# Patient Record
Sex: FEMALE | Race: WHITE | Employment: OTHER | URBAN - METROPOLITAN AREA
[De-identification: names, ages, dates, MRNs, and addresses within clinical notes are randomized per-mention and may not be internally consistent; named-entity substitution may affect disease eponyms.]

---

## 2017-08-25 ENCOUNTER — TRANSFERRED RECORDS (OUTPATIENT)
Dept: HEALTH INFORMATION MANAGEMENT | Facility: CLINIC | Age: 82
End: 2017-08-25

## 2017-09-11 ENCOUNTER — TRANSFERRED RECORDS (OUTPATIENT)
Dept: HEALTH INFORMATION MANAGEMENT | Facility: CLINIC | Age: 82
End: 2017-09-11

## 2017-12-15 ENCOUNTER — TRANSFERRED RECORDS (OUTPATIENT)
Dept: HEALTH INFORMATION MANAGEMENT | Facility: CLINIC | Age: 82
End: 2017-12-15

## 2017-12-15 LAB
CREAT SERPL-MCNC: 0.74 MG/DL (ref 0.55–1.02)
GFR SERPL CREATININE-BSD FRML MDRD: >60 ML/MIN/1.73M2
GLUCOSE SERPL-MCNC: 114 MG/DL (ref 74–106)
POTASSIUM SERPL-SCNC: 3.8 MMOL/L (ref 3.5–5.1)

## 2018-01-31 ENCOUNTER — TRANSFERRED RECORDS (OUTPATIENT)
Dept: HEALTH INFORMATION MANAGEMENT | Facility: CLINIC | Age: 83
End: 2018-01-31

## 2018-02-01 ENCOUNTER — TRANSFERRED RECORDS (OUTPATIENT)
Dept: HEALTH INFORMATION MANAGEMENT | Facility: CLINIC | Age: 83
End: 2018-02-01

## 2018-02-13 ENCOUNTER — TELEPHONE (OUTPATIENT)
Dept: GASTROENTEROLOGY | Facility: CLINIC | Age: 83
End: 2018-02-13

## 2018-02-14 NOTE — TELEPHONE ENCOUNTER
INCOMING RECORDS DOCUMENTATION    Name of Caller: Kala  Phone Number: Home number on file [phone] (home)  Relation: Self    Location records coming from: [facility]  Date records sent: 02/13/18  Fax number records sent to: [phone]  Department receiving records: Adult Clinics: Gastroenterology (GI) p [id number]    Does patient have appointment: no   If has appointment, with which provider: Requesting Dr. Cho    Clinic to follow up with caller: yes  If called back can a detailed message be left: yes       Action taken: Message Routed to: Adult Clinics: Gastroenterology (GI) p [id number] - Carol Westholter

## 2018-02-15 NOTE — TELEPHONE ENCOUNTER
Records reviewed and noted no C Diff test results. Call placed to [facility] to have them faxed to me. Fax received, but will invalid information.  Spoke to patient who states that she had a positive C Diff during hospital stay in August ([facility]) and again on December 18th (specimen taken to [facility] in [place]).  Call placed again to [facility] department.  Message left to call me back as soon as possible.      Carol Westholter

## 2018-02-21 ENCOUNTER — OFFICE VISIT (OUTPATIENT)
Dept: GASTROENTEROLOGY | Facility: CLINIC | Age: 83
End: 2018-02-21
Payer: MEDICARE

## 2018-02-21 VITALS — WEIGHT: 128 LBS

## 2018-02-21 DIAGNOSIS — A04.71 RECURRENT CLOSTRIDIUM DIFFICILE DIARRHEA: Primary | ICD-10-CM

## 2018-02-21 PROCEDURE — 99205 OFFICE O/P NEW HI 60 MIN: CPT | Performed by: INTERNAL MEDICINE

## 2018-02-21 RX ORDER — CHLORAL HYDRATE 500 MG
CAPSULE ORAL
COMMUNITY

## 2018-02-21 RX ORDER — ASPIRIN 81 MG/1
TABLET ORAL
COMMUNITY
Start: 2017-01-18

## 2018-02-21 RX ORDER — ERGOCALCIFEROL 1.25 MG/1
CAPSULE, LIQUID FILLED ORAL
COMMUNITY

## 2018-02-21 RX ORDER — ACETAMINOPHEN 325 MG/1
TABLET ORAL
COMMUNITY
Start: 2017-01-18

## 2018-02-21 RX ORDER — ESCITALOPRAM OXALATE 5 MG/1
TABLET ORAL
COMMUNITY
Start: 2017-01-18

## 2018-02-21 RX ORDER — DIPHENOXYLATE HYDROCHLORIDE AND ATROPINE SULFATE 2.5; .025 MG/1; MG/1
TABLET ORAL
COMMUNITY

## 2018-02-21 RX ORDER — MUPIROCIN 20 MG/G
OINTMENT TOPICAL
COMMUNITY
Start: 2017-01-18

## 2018-02-21 RX ORDER — LEVALBUTEROL INHALATION SOLUTION 1.25 MG/3ML
SOLUTION RESPIRATORY (INHALATION)
COMMUNITY
Start: 2017-01-31

## 2018-02-21 RX ORDER — ATORVASTATIN CALCIUM 20 MG/1
TABLET, FILM COATED ORAL
Status: ON HOLD | COMMUNITY
Start: 2017-04-11 | End: 2018-09-10

## 2018-02-21 RX ORDER — MULTIVIT WITH MINERALS/LUTEIN
1000 TABLET ORAL
COMMUNITY

## 2018-02-21 RX ORDER — LOPERAMIDE HCL 2 MG
CAPSULE ORAL
COMMUNITY
Start: 2017-01-18

## 2018-02-21 RX ORDER — RAMIPRIL 2.5 MG/1
CAPSULE ORAL
COMMUNITY
End: 2020-02-24

## 2018-02-21 RX ORDER — ALPRAZOLAM 0.25 MG
TABLET ORAL
COMMUNITY
Start: 2017-03-09

## 2018-02-21 RX ORDER — ETHAMBUTOL HYDROCHLORIDE 400 MG/1
TABLET, FILM COATED ORAL
COMMUNITY
Start: 2017-02-16

## 2018-02-21 RX ORDER — ANASTROZOLE 1 MG/1
TABLET ORAL
COMMUNITY
Start: 2017-03-10

## 2018-02-21 RX ORDER — IPRATROPIUM BROMIDE AND ALBUTEROL SULFATE 2.5; .5 MG/3ML; MG/3ML
SOLUTION RESPIRATORY (INHALATION)
COMMUNITY
Start: 2017-01-18

## 2018-02-21 ASSESSMENT — PAIN SCALES - GENERAL: PAINLEVEL: NO PAIN (0)

## 2018-02-21 NOTE — NURSING NOTE
Kala Olmedo's goals for this visit include:   Chief Complaint   Patient presents with     Consult     C diff        She requests these members of her care team be copied on today's visit information: yes    PCP: No Ref-Primary, Physician    Referring Provider:  Eleonora Grier  [facility]  [address] [place] [address]    Chief Complaint   Patient presents with     Consult     C diff        Initial Wt 58.1 kg (128 lb) There is no height or weight on file to calculate BMI.  Medication Reconciliation: complete    Do you need any medication refills at today's visit? MAINE Montenegro, CMA

## 2018-02-21 NOTE — PROGRESS NOTES
Visit Date:   02/21/2018      The patient is an 87-year-old woman referred by Dr. Eleonora Grier from [facility] for evaluation and treatment of recurrent Clostridium difficile infection.  The patient is currently on vancomycin, doing reasonably well with about 3 bowel movements per day which she describes as soft and pasty.  She wears Depends.  She continues to be anxious about fecal urgency.  The infection was first diagnosed in 08/2017.  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  She also had exposure to Flagyl in May for bacterial vaginosis.  The pneumonia treatment included azithromycin, ethambutol and rifampin.  She did develop a yeast infection also as a complication.  When she did develop diarrhea, the bowel movement frequency increased about 4-5 per day, occasionally during the night.  Fecal urgency and near incontinence were a big concern, and she essentially became housebound.  This was treated with vancomycin with some improvement in symptoms.  She had another documented relapse in December.  Although it does not seem like the symptoms necessarily went away.  That was also treated with vancomycin once again with some improvement, and then she promptly relapsed again, and she continues to be on vancomycin now.  The last documented C. diff infection was 02/01 of this year.      PAST MEDICAL HISTORY:  Includes:   1.  Three hip surgeries in 2005/2006.   2.  Osteoarthritis.     3.  She has history of breast cancer, status post 1 breast removal in 2011.   4.  Hypertension.   5.  History of fall with fractures.   6.  A long history of tobacco use.  She quit a year ago.   7.  History of congestive heart failure.        MEDICATIONS:  She takes Imodium and as needed, but only occasionally.        SOCIAL HISTORY:  She is a retired schoolteacher.  She has never had a colonoscopy.  She uses a walker for mobility.       PHYSICAL EXAMINATION:   GENERAL:  The patient is slightly hard of hearing but otherwise alert, oriented, asking excellent questions, in no acute distress.      ASSESSMENT AND PLAN:  The patient is on her third course of vancomycin.  She has significant exposure to antibiotics over 2017.  She is elderly with some tendency toward fecal incontinence with loose stool.  I think she is at a very high risk for recurrence of Clostridium difficile infection.  I discussed 3 different options:   1.  Indefinite low dose suppressive vancomycin.   2.  Another would be FMT to break the cycle of recurrence.   3.  The third would be bezlotoxumab, a monoclonal antibody against C. difficile toxin which might allow a greater time period for microbiota recovery.        I think the third option is the least likely to work, primarily because this is her third episode, and exposure to antibiotics has been quite significant, and it is really unlikely that she is going to pull out of the cycle of recurrence given only modest improvement with bezlotoxumab even in primary infection.  I think the FMT is a reasonable option for her.  She already had an antibiotic complication other than C. difficile which is yeast infection, and she is not completely symptom-free on vancomycin.  The treatment is scheduled for 03/21.  We will administer oral FMT capsules.  I explained that the capsule treatment skips the diagnostic portion that often accompanies this procedure.  We could be missing some other conditions such as microscopic colitis.  However, given her age, I do not think a colonoscopy is warranted at this time.  If the capsule treatment fails, colonoscopic FMT would be the next step.      Total time spent in face-to-face consultation was 1 hour, over 50% was spent counseling and coordinating care.         JAGRUTI SABILLON MD             D: 02/21/2018   T: 02/21/2018   MT: SORAYA      Name:     NENO CORCORAN   MRN:      [id number]         Account:      [id number]   :      1930           Visit Date:   2018      Document: [id number]

## 2018-02-21 NOTE — MR AVS SNAPSHOT
After Visit Summary   2/21/2018    Kala Olmedo    MRN: [id number]           Patient Information     Date Of Birth          12/12/1930        Visit Information        Provider Department      2/21/2018 8:30 AM Stefano Cho MD [facility]        Care Instructions    1.           Follow-ups after your visit        Your next 10 appointments already scheduled     Mar 21, 2018 10:00 AM CDT   Return Visit with Stefano Cho MD   [facility]    [address] [place] [id number] [phone]              Who to contact     If you have questions or need follow up information about today's clinic visit or your schedule please contact [facility] directly at [phone].  Normal or non-critical lab and imaging results will be communicated to you by MyChart, letter or phone within 4 business days after the clinic has received the results. If you do not hear from us within 7 days, please contact the clinic through MyChart or phone. If you have a critical or abnormal lab result, we will notify you by phone as soon as possible.  Submit refill requests through C[employer] or call your pharmacy and they will forward the refill request to us. Please allow 3 business days for your refill to be completed.          Additional Information About Your Visit        Salesfusionhart Information     Cinetraffic is an electronic gateway that provides easy, online access to your medical records. With Cinetraffic, you can request a clinic appointment, read your test results, renew a prescription or communicate with your care team.     To sign up for [employer]ffic visit the website at [email or web address]   You will be asked to enter the access code listed below, as well as some personal information. Please follow the directions to create your username and password.     Your access code is: [email or web address]  Expires: 5/22/2018 10:01 AM     Your  access code will  in 90 days. If you need help or a new code, please contact your [facility] or call [phone] for assistance.        Care EveryWhere ID     This is your Care EveryWhere ID. This could be used by other organizations to access your [facility] medical records  [id number]         Blood Pressure from Last 3 Encounters:   No data found for BP    Weight from Last 3 Encounters:   18 58.1 kg (128 lb)              Today, you had the following     No orders found for display       Primary Care Provider Fax #    Physician No Ref-Primary [phone]       No address on file        Equal Access to Services     [facility]: Hadii aad ku hadasho Soomaali, waaxda luqadaha, qaybta kaalmada ademoeyada, gamaliel avalos . So [facility] [phone].    ATENCIÓN: Si habla español, tiene a esparza disposición servicios gratuitos de asistencia lingüística. Llame al [phone].    We comply with applicable federal civil rights laws and [place] laws. We do not discriminate on the basis of race, color, national origin, age, disability, sex, sexual orientation, or gender identity.            Thank you!     Thank you for choosing [facility]  for your care. Our goal is always to provide you with excellent care. Hearing back from our patients is one way we can continue to improve our services. Please take a few minutes to complete the written survey that you may receive in the mail after your visit with us. Thank you!             Your Updated Medication List - Protect others around you: Learn how to safely use, store and throw away your medicines at www.disposemymeds.org.          This list is accurate as of 18 10:01 AM.  Always use your most recent med list.                   Brand Name Dispense Instructions for use Diagnosis    ALPRAZolam 0.25 MG tablet    XANAX     TK 1 T PO  TID PRA        anastrozole 1 MG tablet    ARIMIDEX     TK 1 T PO  QD        ascorbic acid 1000 MG Tabs    vitamin C     Take 1,000 mg by mouth        aspirin EC 81 MG EC tablet      TK 1 T PO D        atorvastatin 20 MG tablet    LIPITOR          escitalopram 5 MG tablet    LEXAPRO     TK 1 T PO D        ethambutol 400 MG tablet    MYAMBUTOL          fish oil-omega-3 fatty acids 1000 MG capsule           INCRUSE ELLIPTA 62.5 MCG/INH oral inhaler   Generic drug:  umeclidinium      USE 1 PUFF D        ipratropium - albuterol 0.5 mg/2.5 mg/3 mL 0.5-2.5 (3) MG/3ML neb solution    DUONEB     NEBULIZE 1 VIAL Q 4 H PRF COPD        levalbuterol 1.25 MG/3ML neb solution    XOPENEX     NEBULIZE 1 VIAL QID FOR COPD        loperamide 2 MG capsule    IMODIUM     TK 1 TO 2 CS PO Q 4 H PRF DIARRHEA        MULTI-VITAMINS Tabs           mupirocin 2 % ointment    BACTROBAN     AVA TOPICALLY AA BID PRF SUPERFICIAL SKIN INFECTIONS        Q- MG tablet   Generic drug:  acetaminophen      TK 1 TO 2 TS PO Q 4 H PRN P. USE 1 T FOR PAIN SCALE 1 TO 5 AND USE 2 TS FOR PAIN SCALE 6 TO 10.        ramipril 2.5 MG capsule    ALTACE          VANCOMYCIN HCL PO      Take 125 mg by mouth        vitamin D 17617 UNIT capsule    ERGOCALCIFEROL

## 2018-02-27 ENCOUNTER — TELEPHONE (OUTPATIENT)
Dept: GASTROENTEROLOGY | Facility: CLINIC | Age: 83
End: 2018-02-27

## 2018-02-27 NOTE — TELEPHONE ENCOUNTER
Health Call Center    Phone Message    May a detailed message be left on voicemail: yes    Reason for Call: Other: patient is so worried and feels she just can't make it until 3/21- she requests a call back with any information that can be given.  please advise. the U did not have anything sooner with Dr BRISENO.     Action Taken: Message routed to:  Adult Clinics: Gastroenterology (GI) p [id number]

## 2018-02-28 NOTE — TELEPHONE ENCOUNTER
Returned call to patient to update that was the soonest available. She understood.    Alysa Churchill RN, BSN  [facility]  GI/Gen Surg/Hepatology Care Coordinator

## 2018-03-21 ENCOUNTER — OFFICE VISIT (OUTPATIENT)
Dept: GASTROENTEROLOGY | Facility: CLINIC | Age: 83
End: 2018-03-21
Payer: MEDICARE

## 2018-03-21 VITALS — SYSTOLIC BLOOD PRESSURE: 146 MMHG | HEART RATE: 99 BPM | DIASTOLIC BLOOD PRESSURE: 87 MMHG | WEIGHT: 126.8 LBS

## 2018-03-21 DIAGNOSIS — A04.71 RECURRENT CLOSTRIDIUM DIFFICILE DIARRHEA: Primary | ICD-10-CM

## 2018-03-21 PROCEDURE — 99214 OFFICE O/P EST MOD 30 MIN: CPT | Performed by: INTERNAL MEDICINE

## 2018-03-21 ASSESSMENT — PAIN SCALES - GENERAL: PAINLEVEL: NO PAIN (0)

## 2018-03-21 NOTE — MR AVS SNAPSHOT
After Visit Summary   3/21/2018    Kala Olmedo    MRN: [id number]           Patient Information     Date Of Birth          12/12/1930        Visit Information        Provider Department      3/21/2018 10:00 AM Stefano Cho MD [facility]        Today's Diagnoses     Recurrent Clostridium difficile diarrhea    -  1       Follow-ups after your visit        Your next 10 appointments already scheduled     May 16, 2018  9:30 AM CDT   Return Visit with Stefano Cho MD   [facility] ([facility])    [address] [place] [address] [phone]              Who to contact     If you have questions or need follow up information about today's clinic visit or your schedule please contact [facility] directly at [phone].  Normal or non-critical lab and imaging results will be communicated to you by MyChart, letter or phone within 4 business days after the clinic has received the results. If you do not hear from us within 7 days, please contact the clinic through MyChart or phone. If you have a critical or abnormal lab result, we will notify you by phone as soon as possible.  Submit refill requests through [employer] or call your pharmacy and they will forward the refill request to us. Please allow 3 business days for your refill to be completed.          Additional Information About Your Visit        [employer] ResearchharMEDOP Information     [employer] Point is an electronic gateway that provides easy, online access to your medical records. With [employer] Point, you can request a clinic appointment, read your test results, renew a prescription or communicate with your care team.     To sign up for [employer] Point visit the website at [email or web address]   You will be asked to enter the access code listed below, as well as some personal information. Please follow the directions to create your username and password.     Your access code is:  [email or web address]  Expires: 2018 11:01 AM     Your access code will  in 90 days. If you need help or a new code, please contact your [facility] or call [phone] for assistance.        Care EveryWhere ID     This is your Care EveryWhere ID. This could be used by other organizations to access your [facility] medical records  [id number]        Your Vitals Were     Pulse                   99            Blood Pressure from Last 3 Encounters:   18 146/87    Weight from Last 3 Encounters:   18 57.5 kg (126 lb 12.8 oz)   18 58.1 kg (128 lb)              Today, you had the following     No orders found for display       Primary Care Provider Fax #    Physician No Ref-Primary [phone]       No address on file        Equal Access to Services     [facility] : Hadii jesika tristan Soreanna, waaxda luqadaha, qaybta kaalmada adeegyada, gamaliel avalos . So [facility] [phone].    ATENCIÓN: Si habla español, tiene a esparza disposición servicios gratuitos de asistencia lingüística. Llame al [phone].    We comply with applicable federal civil rights laws and [place] laws. We do not discriminate on the basis of race, color, national origin, age, disability, sex, sexual orientation, or gender identity.            Thank you!     Thank you for choosing [facility]  for your care. Our goal is always to provide you with excellent care. Hearing back from our patients is one way we can continue to improve our services. Please take a few minutes to complete the written survey that you may receive in the mail after your visit with us. Thank you!             Your Updated Medication List - Protect others around you: Learn how to safely use, store and throw away your medicines at www.disposemymeds.org.          This list is accurate as of 3/21/18 10:58 AM.  Always use your most recent med list.                   Brand Name Dispense  Instructions for use Diagnosis    ALPRAZolam 0.25 MG tablet    XANAX     TK 1 T PO  TID PRA        anastrozole 1 MG tablet    ARIMIDEX     TK 1 T PO QD        ascorbic acid 1000 MG Tabs    vitamin C     Take 1,000 mg by mouth        aspirin EC 81 MG EC tablet      TK 1 T PO D        atorvastatin 20 MG tablet    LIPITOR          escitalopram 5 MG tablet    LEXAPRO     TK 1 T PO D        ethambutol 400 MG tablet    MYAMBUTOL          fish oil-omega-3 fatty acids 1000 MG capsule           INCRUSE ELLIPTA 62.5 MCG/INH oral inhaler   Generic drug:  umeclidinium      USE 1 PUFF D        ipratropium - albuterol 0.5 mg/2.5 mg/3 mL 0.5-2.5 (3) MG/3ML neb solution    DUONEB     NEBULIZE 1 VIAL Q 4 H PRF COPD        levalbuterol 1.25 MG/3ML neb solution    XOPENEX     NEBULIZE 1 VIAL QID FOR COPD        loperamide 2 MG capsule    IMODIUM     TK 1 TO 2 CS PO Q 4 H PRF DIARRHEA        MULTI-VITAMINS Tabs           mupirocin 2 % ointment    BACTROBAN     AVA TOPICALLY AA BID PRF SUPERFICIAL SKIN INFECTIONS        Q- MG tablet   Generic drug:  acetaminophen      TK 1 TO 2 TS PO Q 4 H PRN P. USE 1 T FOR PAIN SCALE 1 TO 5 AND USE 2 TS FOR PAIN SCALE 6 TO 10.        ramipril 2.5 MG capsule    ALTACE          VANCOMYCIN HCL PO      Take 125 mg by mouth        vitamin D 08674 UNIT capsule    ERGOCALCIFEROL

## 2018-03-21 NOTE — NURSING NOTE
Kala Olmedo's goals for this visit include:   Chief Complaint   Patient presents with     Procedure     FMT- Capsule        She requests these members of her care team be copied on today's visit information: yes    PCP: No Ref-Primary, Physician    Referring Provider:  No referring provider defined for this encounter.    Chief Complaint   Patient presents with     Procedure     FMT- Capsule        Initial /87  Pulse 99  Wt 57.5 kg (126 lb 12.8 oz) There is no height or weight on file to calculate BMI.  Medication Reconciliation: complete    Do you need any medication refills at today's visit? Yes    Amorrrenetta Montenegro CMA

## 2018-03-21 NOTE — PROGRESS NOTES
Visit Date:   2018      The patient is an 87-year-old woman with a history of multiply recurrent C. difficile infection.  She is here today for her oral capsule FMT.  A number of questions were answered.  She signed consent form and took 5 capsules containing 5 x 10 to the 11th bacteria in a freeze dried formulation.  Followup is in 2 months.  She is off vancomycin at this time.  The patient reported having 7 bowel movements yesterday.  She generally gets about 5 when she is on vancomycin.  She has been off vancomycin now for 2 days.      Total time spent in face-to-face consultation was 25 minutes.  Over 50% was spent in counseling and coordinating care.         JAGRUTI SABILLON MD             D: 2018   T: 2018   MT: HERI      Name:     NENO CORCORAN   MRN:      [id number]        Account:      [id number]   :      1930           Visit Date:   2018      Document: [id number]

## 2018-03-27 ENCOUNTER — TELEPHONE (OUTPATIENT)
Dept: GASTROENTEROLOGY | Facility: CLINIC | Age: 83
End: 2018-03-27

## 2018-03-27 DIAGNOSIS — R19.7 DIARRHEA: Primary | ICD-10-CM

## 2018-03-27 NOTE — TELEPHONE ENCOUNTER
[facility] Call [facility]    Phone Message    May a detailed message be left on voicemail: yes    Reason for Call: Other: Patient wants to know if she should go back on previous medication she was on, since she is not any better. call to discuss     Action Taken: Message routed to:  Adult Clinics: Gastroenterology (GI) p [id number]

## 2018-03-28 NOTE — TELEPHONE ENCOUNTER
Nurse returned call and recommended that she submit stool samples. She requested that the orders get sent to the [facility]. Nurse sent them there for her to complete.    Alysa Churchill RN, BSN  [facility]  GI/Gen Surg/Hepatology Care Coordinator

## 2018-03-29 ENCOUNTER — TRANSFERRED RECORDS (OUTPATIENT)
Dept: HEALTH INFORMATION MANAGEMENT | Facility: CLINIC | Age: 83
End: 2018-03-29

## 2018-03-30 NOTE — TELEPHONE ENCOUNTER
"Called and left message for [facility] Medical records with request to fax results to [phone].     Called and spoke to patient who reports that the results were sent to her PCP and the nurse called her this morning. Patient stated, \"Wednesday and Thursday was awful. I had 6 loose stools each day, but today is better. I was desperate and took a dose of immodium, so that's why I am better today.\" Informed patient that it is not recommended to take any additional doses of imodium. Patient verbalized understanding. Patient reports that she is eating and drinking okay. Patient denies dizziness, weakness, nausea, and fevers. Informed patient that Dr. Cho is out of the office today, but that writer will contact him for recommendations. Patient aware that she will be contacted with recommendations.    Writer paged Dr. Cho at [phone].    Dorcas Mcgrath RN, BSN          "

## 2018-03-30 NOTE — TELEPHONE ENCOUNTER
Patient reports some improvement relative to Wednesday and Thursday.  She will hold off vancomycin today.  I will call her again tomorrow to follow-up.

## 2018-03-30 NOTE — TELEPHONE ENCOUNTER
Patient called clinic wanting to speak with Dr. Cho nurses, stool sample came back positive for C-diff (still) patient states she cannot wait until her May appointment and would like to be seen eralier and would like a call back to advise her what she can do in the meantime. Thank you

## 2018-03-30 NOTE — TELEPHONE ENCOUNTER
Received return call from Dr. Cho who recommends treatment with vancomycin. Per Dr. Cho, he will contact the patient and will order the vancomycin. Will send message with request for the GI RNCC to follow up with patient next week.    Dorcas Mcgrath RN, BSN

## 2018-03-31 ENCOUNTER — TELEPHONE (OUTPATIENT)
Dept: GASTROENTEROLOGY | Facility: CLINIC | Age: 83
End: 2018-03-31

## 2018-03-31 NOTE — TELEPHONE ENCOUNTER
The patient is better today.  She only had one BM and it was firmer.  Last Imodium was on Thursday.

## 2018-04-03 ENCOUNTER — TELEPHONE (OUTPATIENT)
Dept: GASTROENTEROLOGY | Facility: CLINIC | Age: 83
End: 2018-04-03

## 2018-04-03 DIAGNOSIS — A04.71 RECURRENT CLOSTRIDIUM DIFFICILE DIARRHEA: Primary | ICD-10-CM

## 2018-04-10 ENCOUNTER — TELEPHONE (OUTPATIENT)
Dept: GASTROENTEROLOGY | Facility: CLINIC | Age: 83
End: 2018-04-10

## 2018-04-10 DIAGNOSIS — A04.72 COLITIS DUE TO CLOSTRIDIUM DIFFICILE: Primary | ICD-10-CM

## 2018-04-10 NOTE — TELEPHONE ENCOUNTER
[facility] Call Center    Phone Message    May a detailed message be left on voicemail: yes    Reason for Call: Patient called to [place] with a member of Dr. Cho care team. Requesting a call back today. Thank you     Action Taken: Message routed to:  Adult Clinics: Gastroenterology (GI) p [id number]

## 2018-04-10 NOTE — TELEPHONE ENCOUNTER
"Returned call and spoke to patient. Patient reports that it has been 5 weeks since her FMT and she is taking the vancomycin that Dr. Cho prescribed. Patient stated, \"I am feeling a little better, but I am calling about my vanco prescription.\" Patient reports that the pharmacy informed her that the insurance can't pay for the vanco because they don't make the liquid kind anymore and that the pharmacy can't use the current prescription. Patient is requesting for a new prescription for the commercially available vanco capsules to be sent to [facility] in [place]. Informed patient that a message will be sent with request for the GI team to follow up with this tomorrow. Patient reports that she does not need a call back.    Dorcas Mcgrath RN, BSN    "

## 2018-04-11 RX ORDER — VANCOMYCIN HYDROCHLORIDE 125 MG/1
125 CAPSULE ORAL 4 TIMES DAILY
Qty: 30 CAPSULE | Refills: 0 | Status: SHIPPED | OUTPATIENT
Start: 2018-04-11 | End: 2018-04-13

## 2018-04-11 NOTE — TELEPHONE ENCOUNTER
Reason for call:  Other   Patient called regarding (reason for call): call back  Additional comments: Still not feeling well, wants to be sooner than her May appointment.  She would appreciate a call from the GI care team, Tammie patient.     Phone number to reach patient:  Cell number on file:  [phone] (home)   No relevant phone numbers on file.       Best Time:  any    Can we leave a detailed message on this number?  YES

## 2018-04-11 NOTE — TELEPHONE ENCOUNTER
Nurse returned call to patient, she stated her insurance will not cover the solution anymore, requesting capsules. Nurse sent new script to her pharmacy. She has an appointment to follow up with Dr. Cho on  5/16/18, but does not feel she can make it that long. She continues to have diarrhea while on Vancomycin. She is having 8-10 loose stools per day. She had a previous capsule FMT on 3/21/18 that resulted in her developing Cdiff again, in the last note Dr. Cho suggested that she get another FMT. Nurse will send a message to Dr. Cho to determine if she needs an earlier follow up or if she can get scheduled another FMT.    Alysa Churchill RN, BSN  [facility]  GI/Gen Surg/Hepatology Care Coordinator

## 2018-04-11 NOTE — TELEPHONE ENCOUNTER
"Cleve Chowdhury received a voicemail for patient requesting an appointment with Dr. Cho. See message below    \"Good Yanet,  I just received a call from a patient looking to schedule an appointment.  Can you please contact the patient to help with scheduling? Thanks!\"    Kala Olmedo  Appointment with Dr. Cho for 4/18  [phone]      Cleve Chowdhury MBA   \"      Please reach out to the patient to confirm or clarify.    Thank you  "

## 2018-04-11 NOTE — TELEPHONE ENCOUNTER
Dr. Cho stated she should continue on vancomycin QID until she is seen in May, and that she can take Imodium for loose stools. He will call her tomorrow. Nurse updated patient and she verbalized understanding.    Alysa Churchill RN, BSN  [facility]  GI/Gen Surg/Hepatology Care Coordinator

## 2018-04-13 RX ORDER — VANCOMYCIN HYDROCHLORIDE 125 MG/1
125 CAPSULE ORAL DAILY
Qty: 30 CAPSULE | Refills: 0 | Status: SHIPPED | OUTPATIENT
Start: 2018-04-13

## 2018-04-30 ENCOUNTER — TELEPHONE (OUTPATIENT)
Dept: GASTROENTEROLOGY | Facility: CLINIC | Age: 83
End: 2018-04-30

## 2018-04-30 DIAGNOSIS — R19.7 DIARRHEA, UNSPECIFIED TYPE: Primary | ICD-10-CM

## 2018-04-30 NOTE — TELEPHONE ENCOUNTER
[facility] Call Center    Phone Message    May a detailed message be left on voicemail: yes    Reason for Call: Other: Patient is calling requeting to speak with the clinic regarding being in urgent care and following up as Urgent care doctor reccomends. Please advise.     Action Taken: Message routed to:  Adult Clinics: Gastroenterology (GI) p [id number]

## 2018-04-30 NOTE — TELEPHONE ENCOUNTER
Nurse returned call, she would like to set up the FMT capsule appointment. Nurse updated Dr. Cho and he will contact her, she verbalized understanding.    Alysa Churchill RN, BSN, PHN  [facility]  GI/Gen Surg/Hepatology Care Coordinator

## 2018-04-30 NOTE — TELEPHONE ENCOUNTER
The patient was in the hospital with a UTI x 5 days.  She is on vancomycin once daily.  She controls her diarrhea (incompletely) with vancomycin.      She claims that she is always C. Difficile positive while taking vancomycin, though.  This would be highly unusual, but we will test her at this time.

## 2018-05-02 ENCOUNTER — TELEPHONE (OUTPATIENT)
Dept: GASTROENTEROLOGY | Facility: CLINIC | Age: 83
End: 2018-05-02

## 2018-05-02 NOTE — TELEPHONE ENCOUNTER
[facility] Call Center    Phone Message    May a detailed message be left on voicemail: yes    Reason for Call: Patient called upset as Dr Cho asked that she drop off a stool sample at [facility] [place] in [place]. When patient arrived they were denied. Requesting order or authorization request to be sent so they will accept the sample. Thank you    Action Taken: Message routed to:  Adult Clinics: Gastroenterology (GI) p [id number]

## 2018-05-02 NOTE — TELEPHONE ENCOUNTER
Nurse called and left a message on patient's VM to return call.    Alysa Churchill RN, BSN, PHN  [facility]  GI/Gen Surg/Hepatology Care Coordinator

## 2018-05-04 NOTE — TELEPHONE ENCOUNTER
Patient returned call, stated she needed the orders sent over to [facility]. Nurse faxed over orders today.    Alysa Churchill RN, BSN, PHN  [facility]  GI/Gen Surg/Hepatology Care Coordinator

## 2018-05-07 ENCOUNTER — TRANSFERRED RECORDS (OUTPATIENT)
Dept: HEALTH INFORMATION MANAGEMENT | Facility: CLINIC | Age: 83
End: 2018-05-07

## 2018-05-10 ENCOUNTER — TELEPHONE (OUTPATIENT)
Dept: GASTROENTEROLOGY | Facility: CLINIC | Age: 83
End: 2018-05-10

## 2018-05-10 NOTE — TELEPHONE ENCOUNTER
[facility] Call Center    Phone Message    May a detailed message be left on voicemail: yes    Reason for Call: Other: Patient is calling to follow up on specimen results for stool samples that were sent in to [facility] on 05/07/18. Please call to advise.      Action Taken: Message routed to:  Adult Clinics: Gastroenterology (GI) p [id number]

## 2018-05-10 NOTE — TELEPHONE ENCOUNTER
The patient was contacted and informed that her 5/7/18 c.diff test results were NEGATIVE (available in Care Everywhere). She will follow up with Dr. Cho on 5/16/18.    Caroline Greer CMA

## 2018-05-16 ENCOUNTER — OFFICE VISIT (OUTPATIENT)
Dept: GASTROENTEROLOGY | Facility: CLINIC | Age: 83
End: 2018-05-16
Payer: MEDICARE

## 2018-05-16 VITALS — HEART RATE: 94 BPM | DIASTOLIC BLOOD PRESSURE: 83 MMHG | SYSTOLIC BLOOD PRESSURE: 134 MMHG | WEIGHT: 124.8 LBS

## 2018-05-16 DIAGNOSIS — A04.71 RECURRENT CLOSTRIDIUM DIFFICILE DIARRHEA: Primary | ICD-10-CM

## 2018-05-16 PROCEDURE — 99213 OFFICE O/P EST LOW 20 MIN: CPT | Performed by: INTERNAL MEDICINE

## 2018-05-16 ASSESSMENT — PAIN SCALES - GENERAL: PAINLEVEL: NO PAIN (0)

## 2018-05-16 NOTE — NURSING NOTE
Kala Olmedo's goals for this visit include:   Chief Complaint   Patient presents with     RECHECK     She requests these members of her care team be copied on today's visit information: None    Initial /83 (BP Location: Left arm, Patient Position: Chair, Cuff Size: Adult Regular)  Pulse 94  Wt 56.6 kg (124 lb 12.8 oz) There is no height or weight on file to calculate BMI.  BP completed using cuff size: regular

## 2018-05-16 NOTE — MR AVS SNAPSHOT
After Visit Summary   2018    Kala Olmedo    MRN: [id number]           Patient Information     Date Of Birth          1930        Visit Information        Provider Department      2018 9:30 AM Stefano Cho MD [facility]        Today's Diagnoses     Recurrent Clostridium difficile diarrhea    -  1       Follow-ups after your visit        Who to contact     If you have questions or need follow up information about today's clinic visit or your schedule please contact [facility] directly at [phone].  Normal or non-critical lab and imaging results will be communicated to you by [employer], letter or phone within 4 business days after the clinic has received the results. If you do not hear from us within 7 days, please contact the clinic through [employer] or phone. If you have a critical or abnormal lab result, we will notify you by phone as soon as possible.  Submit refill requests through [employer] or call your pharmacy and they will forward the refill request to us. Please allow 3 business days for your refill to be completed.          Additional Information About Your Visit        MyChart Information     [employer] is an electronic gateway that provides easy, online access to your medical records. With [employer], you can request a clinic appointment, read your test results, renew a prescription or communicate with your care team.     To sign up for [employer] visit the website at [email or web address]   You will be asked to enter the access code listed below, as well as some personal information. Please follow the directions to create your username and password.     Your access code is: [email or web address]  Expires: 2018 12:53 PM     Your access code will  in 90 days. If you need help or a new code, please contact your [facility] or call [phone] for assistance.        Care EveryWhere ID     This is your Care  EveryWhere ID. This could be used by other organizations to access your [place] medical records  [id number]        Your Vitals Were     Pulse                   94            Blood Pressure from Last 3 Encounters:   05/16/18 134/83   03/21/18 146/87    Weight from Last 3 Encounters:   05/16/18 56.6 kg (124 lb 12.8 oz)   03/21/18 57.5 kg (126 lb 12.8 oz)   02/21/18 58.1 kg (128 lb)              Today, you had the following     No orders found for display       Primary Care Provider Fax #    Physician No Ref-Primary [phone]       No address on file        Equal Access to Services     [facility]: Hadii jesika tristan Soreanna, wahipolitoda luvamsi, qaybta kaalmada emelia, gamaliel avalos . So [facility] [phone].    ATENCIÓN: Si habla español, tiene a esparza disposición servicios gratuitos de asistencia lingüística. Llame al [phone].    We comply with applicable federal civil rights laws and [place] laws. We do not discriminate on the basis of race, color, national origin, age, disability, sex, sexual orientation, or gender identity.            Thank you!     Thank you for choosing [facility]  for your care. Our goal is always to provide you with excellent care. Hearing back from our patients is one way we can continue to improve our services. Please take a few minutes to complete the written survey that you may receive in the mail after your visit with us. Thank you!             Your Updated Medication List - Protect others around you: Learn how to safely use, store and throw away your medicines at www.disposemymeds.org.          This list is accurate as of 5/16/18 11:59 PM.  Always use your most recent med list.                   Brand Name Dispense Instructions for use Diagnosis    ALPRAZolam 0.25 MG tablet    XANAX     TK 1 T PO  TID PRA        anastrozole 1 MG tablet    ARIMIDEX     TK 1 T PO QD        ascorbic acid 1000 MG Tabs    vitamin C     Take 1,000 mg by  mouth        aspirin 81 MG EC tablet      TK 1 T PO D        atorvastatin 20 MG tablet    LIPITOR          escitalopram 5 MG tablet    LEXAPRO     TK 1 T PO D        ethambutol 400 MG tablet    MYAMBUTOL          fish oil-omega-3 fatty acids 1000 MG capsule           INCRUSE ELLIPTA 62.5 MCG/INH oral inhaler   Generic drug:  umeclidinium      USE 1 PUFF D        ipratropium - albuterol 0.5 mg/2.5 mg/3 mL 0.5-2.5 (3) MG/3ML neb solution    DUONEB     NEBULIZE 1 VIAL Q 4 H PRF COPD        levalbuterol 1.25 MG/3ML neb solution    XOPENEX     NEBULIZE 1 VIAL QID FOR COPD        loperamide 2 MG capsule    IMODIUM     TK 1 TO 2 CS PO Q 4 H PRF DIARRHEA        MULTI-VITAMINS Tabs           mupirocin 2 % ointment    BACTROBAN     AVA TOPICALLY AA BID PRF SUPERFICIAL SKIN INFECTIONS        Q- MG tablet   Generic drug:  acetaminophen      TK 1 TO 2 TS PO Q 4 H PRN P. USE 1 T FOR PAIN SCALE 1 TO 5 AND USE 2 TS FOR PAIN SCALE 6 TO 10.        ramipril 2.5 MG capsule    ALTACE          * VANCOMYCIN HCL PO      Take 125 mg by mouth        * vancomycin 50 mg/mL Liqd solution    VANOCIN    100 mL    Take 2.5 mLs (125 mg) by mouth 4 times daily    Recurrent Clostridium difficile diarrhea       * vancomycin 125 MG capsule    VANCOCIN    30 capsule    Take 1 capsule (125 mg) by mouth daily    Colitis due to Clostridium difficile       vitamin D 43668 UNIT capsule    ERGOCALCIFEROL          * Notice:  This list has 3 medication(s) that are the same as other medications prescribed for you. Read the directions carefully, and ask your doctor or other care provider to review them with you.

## 2018-05-16 NOTE — PROGRESS NOTES
Visit Date:   2018      The patient is an 87-year-old woman with multiple recurrent C. difficile infections.  She was treated with capsule FMT in March.  However, relapsed within 2 weeks, was placed back on vancomycin.  In April she was actually hospitalized for urinary tract infection but continued with the vancomycin.  She is doing fine, having about 4 bowel movements a day.  The question is whether to repeat the FMT or to remain on vancomycin.  She is 87 years old but reasonably healthy otherwise, sharp mind.  She had only 1 bladder infection so far.  There is no history of recurrent infections.  After some discussion, we decided to re-treat with another capsule administration.  She is running low on the vancomycin supply at this time, so we should be able to do this within a couple days.      Total time spent in face-to-face consultation was 15 minutes; over 50% was spent counseling and coordinating care.         JAGRUTI SABILLON MD             D: 2018   T: 2018   MT: HERI      Name:     NENO CORCORAN   MRN:      [id number]        Account:      [id number]   :      1930           Visit Date:   2018      Document: [id number]

## 2018-05-29 ENCOUNTER — TELEPHONE (OUTPATIENT)
Dept: GASTROENTEROLOGY | Facility: CLINIC | Age: 83
End: 2018-05-29

## 2018-05-29 ENCOUNTER — TRANSFERRED RECORDS (OUTPATIENT)
Dept: HEALTH INFORMATION MANAGEMENT | Facility: CLINIC | Age: 83
End: 2018-05-29

## 2018-05-29 NOTE — TELEPHONE ENCOUNTER
Lab order successfully faxed to [facility] at fax# [phone].    Dorcas Mcgrath RN, BSN

## 2018-05-29 NOTE — TELEPHONE ENCOUNTER
Returned call to patient and discussed the following:    Triage Nurse Call    Symptoms- patient reports 7-8 loose, liquid stools this morning from 6am to 11am.     MD- Dr. Cho    Background- history of c.diff and FMT    Pain- no reports of pain    N/V or fever- denies nausea, vomiting, fevers, and dizziness. Patient reports that she has a decreased appetite but has been able to drink fluids without difficulty.    Medications- patient reports that she took an imodium today at 11am. Informed patient to not take any additional imodium until results of stool test are known. Patient verbalized understanding.    Nurse Action- Informed patient that it is recommended to complete a stool sample to check for c.diff. Patient reports that she is unable to go to a [facility] lab and would like the lab order sent to [facility] in [place]. Lab order for c.diff successfully faxed to [facility] at fax# [phone]. Requested for [facility] to fax results back to clinic.    Dorcas Mcgrath RN, BSN

## 2018-05-29 NOTE — TELEPHONE ENCOUNTER
Patient is trying to drop off a stool sample to be tested at [facility] [place]. They cannot process without an order. Please send an order via fax to [phone]

## 2018-05-29 NOTE — TELEPHONE ENCOUNTER
[facility] Call Center    Phone Message    May a detailed message be left on voicemail: no    Reason for Call: Symptoms or Concerns     If patient has red-flag symptoms, warm transfer to triage line    Current symptom or concern: diarrhea    Symptoms have been present for:  A couple day(s)    Has patient previously been seen for this? No    Are there any new or worsening symptoms? Yes      Action Taken: Message routed to:  Adult Clinics: Gastroenterology (GI) p [id number]

## 2018-05-30 ENCOUNTER — TELEPHONE (OUTPATIENT)
Dept: GASTROENTEROLOGY | Facility: CLINIC | Age: 83
End: 2018-05-30

## 2018-05-30 DIAGNOSIS — A04.71 RECURRENT CLOSTRIDIUM DIFFICILE DIARRHEA: Primary | ICD-10-CM

## 2018-05-30 RX ORDER — VANCOMYCIN HYDROCHLORIDE 125 MG/1
125 CAPSULE ORAL 4 TIMES DAILY
Qty: 40 CAPSULE | Refills: 0 | Status: SHIPPED | OUTPATIENT
Start: 2018-05-30 | End: 2018-06-09

## 2018-05-30 RX ORDER — ACETAMINOPHEN 325 MG/1
650 TABLET ORAL
Status: CANCELLED | OUTPATIENT
Start: 2018-06-06

## 2018-05-30 NOTE — TELEPHONE ENCOUNTER
The stool test was positive for C. Difficile.  She had some loose BMs yesterday and today, but isn't feeling too bad.  Capsule FMT was 5.19.  So, relapse is ~ day 10.    The plan is to re-start vancomycin now, get Zinplava infusion toward the end of next week, and do another capsule FMT once vancomycin is finished.

## 2018-05-30 NOTE — TELEPHONE ENCOUNTER
Please arrange her Zinplava infusion next week.  Thanks, Pasha     Nurse spoke to patient and gave her the number to call and schedule appointment.    Alysa Churchill RN, BSN, PHN  [facility]  GI/Gen Surg/Hepatology Care Coordinator

## 2018-05-30 NOTE — TELEPHONE ENCOUNTER
5/29/18 c.diff test available through Care Everywhere ([facility]) - positive result. Will route to [facility] to review and advise.    Caroline Greer, CMA

## 2018-05-30 NOTE — TELEPHONE ENCOUNTER
5.30.18  Patient requesting lab results today.  She dropped off stool sample yesterday and wants a call back today.   [phone]

## 2018-06-07 ENCOUNTER — INFUSION THERAPY VISIT (OUTPATIENT)
Dept: INFUSION THERAPY | Facility: CLINIC | Age: 83
End: 2018-06-07
Attending: INTERNAL MEDICINE
Payer: MEDICARE

## 2018-06-07 VITALS
RESPIRATION RATE: 18 BRPM | HEART RATE: 102 BPM | WEIGHT: 123.9 LBS | OXYGEN SATURATION: 97 % | TEMPERATURE: 98.7 F | DIASTOLIC BLOOD PRESSURE: 63 MMHG | SYSTOLIC BLOOD PRESSURE: 135 MMHG

## 2018-06-07 DIAGNOSIS — A04.71 RECURRENT CLOSTRIDIUM DIFFICILE DIARRHEA: Primary | ICD-10-CM

## 2018-06-07 PROCEDURE — 25000128 H RX IP 250 OP 636: Mod: ZF | Performed by: INTERNAL MEDICINE

## 2018-06-07 PROCEDURE — 96413 CHEMO IV INFUSION 1 HR: CPT

## 2018-06-07 RX ORDER — ACETAMINOPHEN 325 MG/1
650 TABLET ORAL
Status: CANCELLED | OUTPATIENT
Start: 2018-06-07

## 2018-06-07 RX ADMIN — SODIUM CHLORIDE 562 MG: 900 INJECTION, SOLUTION INTRAVENOUS at 15:35

## 2018-06-07 NOTE — MR AVS SNAPSHOT
After Visit Summary   6/7/2018    Kala Olmedo    MRN: [id number]           Patient Information     Date Of Birth          12/12/1930        Visit Information        Provider Department      6/7/2018 3:00 PM UC 45 ATC; [facility] SPEC INFUSION [facility] Specialty and Procedure        Today's Diagnoses     Recurrent Clostridium difficile diarrhea    -  1      Care Instructions    Dear Kala Olmedo    Thank you for choosing [facility] ([facility]) for your infusion.  The following information is a summary of our appointment as well as important reminders.        Bezlotoxumab injection  Brand Name: ZINPLAVA  What is this medicine?  BEZLOTOXUMAB (bez mary TOX ue mab) is used for certain kinds of bacterial infections in the bowel. It will not work for colds, flu, or other viral infections.  How should I use this medicine?  This medicine is for infusion into a vein. It is given by a health care professional in a hospital or clinic setting.  Talk to your pediatrician regarding the use of this medicine in children. Special care may be needed.  What side effects may I notice from receiving this medicine?  Side effects that you should report to your doctor or health care professional as soon as possible:    allergic reactions like skin rash, itching or hives, swelling of the face, lips, or tongue    breathing problems  Side effects that usually do not require medical attention (report to your doctor or health care professional if they continue or are bothersome):    dizziness    fever    headache    nausea    tiredness  What may interact with this medicine?  Interactions are not expected.  What if I miss a dose?  This does not apply; this medicine is not for regular use.  Where should I keep my medicine?  This drug is given in a hospital or clinic and will not be stored at home.  What should I tell my health care provider before I take  this medicine?  They need to know if you have any of these conditions:    heart disease    an unusual or allergic reaction to bezlotoxumab, other medicines, foods, dyes, or preservatives    pregnant or trying to get pregnant    breast-feeding  What should I watch for while using this medicine?  Tell your doctor or healthcare professional if your symptoms do not start to get better or if they get worse.  Do not treat diarrhea with over the counter products.  NOTE:This sheet is a summary. It may not cover all possible information. If you have questions about this medicine, talk to your doctor, pharmacist, or health care provider. Copyright  2018 Elsevier          Additional information: you received an infusion of Zinplava via IV today.       We look forward in seeing you on your next appointment here at [facility].  Please don t hesitate to call us at [phone] to reschedule any of your appointments or to speak with one of the [facility] registered nurses.  It was a pleasure taking care of you today.    Sincerely,    [facility]  Specialty Infusion & Procedure Center  [address] [place] [address]  [phone]  Phone:  [phone]            Follow-ups after your visit        Who to contact     If you have questions or need follow up information about today's clinic visit or your schedule please contact [facility] SPECIALTY AND PROCEDURE directly at [phone].  Normal or non-critical lab and imaging results will be communicated to you by MyChart, letter or phone within 4 business days after the clinic has received the results. If you do not hear from us within 7 days, please contact the clinic through MyChart or phone. If you have a critical or abnormal lab result, we will notify you by phone as soon as possible.  Submit refill requests through [employer] or call your pharmacy and they will forward the refill request to us. Please allow 3 business days for your refill to  "be completed.          Additional Information About Your Visit        [employer] Information     [employer] lets you send messages to your doctor, view your test results, renew your prescriptions, schedule appointments and more. To sign up, go to [email or web address] . Click on \"Log in\" on the left side of the screen, which will take you to the Welcome page. Then click on \"Sign up Now\" on the right side of the page.     You will be asked to enter the access code listed below, as well as some personal information. Please follow the directions to create your username and password.     Your access code is: [email or web address]  Expires: 2018 12:53 PM     Your access code will  in 90 days. If you need help or a new code, please call your [place] clinic or [phone].        Care EveryWhere ID     This is your Care EveryWhere ID. This could be used by other organizations to access your [place] medical records  [id number]        Your Vitals Were     Pulse Temperature Respirations Pulse Oximetry          92 98.7  F (37.1  C) (Oral) 18 97%         Blood Pressure from Last 3 Encounters:   18 127/83   18 134/83   18 146/87    Weight from Last 3 Encounters:   18 56.2 kg (123 lb 14.4 oz)   18 56.6 kg (124 lb 12.8 oz)   18 57.5 kg (126 lb 12.8 oz)              Today, you had the following     No orders found for display       Primary Care Provider Fax #    Physician No Ref-Primary [phone]       No address on file        Equal Access to Services     [facility] : Hadii jesika tristan Soreanna, waaxda luqadaha, qaybta kaalmagamaliel eason. So [facility] [phone].    ATENCIÓN: Si habla español, tiene a esparza disposición servicios gratuitos de asistencia lingüística. Llame al [phone].    We comply with applicable federal civil rights laws and [place] laws. We do not discriminate on the basis of race, color, national origin, age, disability, " sex, sexual orientation, or gender identity.            Thank you!     Thank you for choosing [facility] SPECIALTY AND PROCEDURE  for your care. Our goal is always to provide you with excellent care. Hearing back from our patients is one way we can continue to improve our services. Please take a few minutes to complete the written survey that you may receive in the mail after your visit with us. Thank you!             Your Updated Medication List - Protect others around you: Learn how to safely use, store and throw away your medicines at www.disposemymeds.org.          This list is accurate as of 6/7/18  3:20 PM.  Always use your most recent med list.                   Brand Name Dispense Instructions for use Diagnosis    ALPRAZolam 0.25 MG tablet    XANAX     TK 1 T PO  TID PRA        anastrozole 1 MG tablet    ARIMIDEX     TK 1 T PO QD        ascorbic acid 1000 MG Tabs    vitamin C     Take 1,000 mg by mouth        aspirin 81 MG EC tablet      TK 1 T PO D        atorvastatin 20 MG tablet    LIPITOR          escitalopram 5 MG tablet    LEXAPRO     TK 1 T PO D        ethambutol 400 MG tablet    MYAMBUTOL          fish oil-omega-3 fatty acids 1000 MG capsule           INCRUSE ELLIPTA 62.5 MCG/INH oral inhaler   Generic drug:  umeclidinium      USE 1 PUFF D        ipratropium - albuterol 0.5 mg/2.5 mg/3 mL 0.5-2.5 (3) MG/3ML neb solution    DUONEB     NEBULIZE 1 VIAL Q 4 H PRF COPD        levalbuterol 1.25 MG/3ML neb solution    XOPENEX     NEBULIZE 1 VIAL QID FOR COPD        loperamide 2 MG capsule    IMODIUM     TK 1 TO 2 CS PO Q 4 H PRF DIARRHEA        MULTI-VITAMINS Tabs           mupirocin 2 % ointment    BACTROBAN     AVA TOPICALLY AA BID PRF SUPERFICIAL SKIN INFECTIONS        Q- MG tablet   Generic drug:  acetaminophen      TK 1 TO 2 TS PO Q 4 H PRN P. USE 1 T FOR PAIN SCALE 1 TO 5 AND USE 2 TS FOR PAIN SCALE 6 TO 10.        ramipril 2.5 MG capsule    ALTACE          * VANCOMYCIN  HCL PO      Take 125 mg by mouth        * vancomycin 50 mg/mL Liqd solution    VANOCIN    100 mL    Take 2.5 mLs (125 mg) by mouth 4 times daily    Recurrent Clostridium difficile diarrhea       * vancomycin 125 MG capsule    VANCOCIN    30 capsule    Take 1 capsule (125 mg) by mouth daily    Colitis due to Clostridium difficile       * vancomycin 125 MG capsule    VANCOCIN    40 capsule    Take 1 capsule (125 mg) by mouth 4 times daily for 10 days    Recurrent Clostridium difficile diarrhea       vitamin D 29851 UNIT capsule    ERGOCALCIFEROL          * Notice:  This list has 4 medication(s) that are the same as other medications prescribed for you. Read the directions carefully, and ask your doctor or other care provider to review them with you.

## 2018-06-07 NOTE — PROGRESS NOTES
Nursing Note  Kala Olmedo [facility] for:   Chief Complaint   Patient presents with     Infusion     Zinplava     During today's [facility] appointment, orders from Dr. Cho were completed.  Frequency: once    Progress note:  Patient identification verified by name and date of birth.  Assessment completed.  Vitals recorded in Doc Flowsheets.  Patient was provided with education regarding infusion and possible side effects.  Patient verbalized understanding.      needed: No  Premedications: were not ordered.  Infusion Rates: infusion given over approximately 1 hour.  Approximate Infusion length:1 hours.   Labs: were not ordered for this appointment.  Vascular access: peripheral IV placed today.  Treatment Conditions: non-applicable.  Patient tolerated infusion: well.        Discharge Plan:   Follow up plan of care with: no scheduled future appointments.   Discharge instructions were reviewed with patient.  Patient/representative verbalized understanding of discharge instructions and all questions answered.  Patient discharged from [facility] in stable condition.    Candace Verma RN       Administrations This Visit     bezlotoxumab (ZINPLAVA) 562 mg in sodium chloride 0.9 % 150 mL intermittent infusion     Admin Date Action Dose Rate Route Administered By          06/07/2018 New Bag 562 mg 187 mL/hr Intravenous Candace Verma RN                           /83  Pulse 92  Temp 98.7  F (37.1  C) (Oral)  Resp 18  Wt 56.2 kg (123 lb 14.4 oz)  SpO2 97%

## 2018-06-07 NOTE — PATIENT INSTRUCTIONS
Dear Kala Olmedo    Thank you for choosing [facility] ([facility]) for your infusion.  The following information is a summary of our appointment as well as important reminders.        Bezlotoxumab injection  Brand Name: ZINPLAVA  What is this medicine?  BEZLOTOXUMAB (bez mary TOX ue mab) is used for certain kinds of bacterial infections in the bowel. It will not work for colds, flu, or other viral infections.  How should I use this medicine?  This medicine is for infusion into a vein. It is given by a health care professional in a hospital or clinic setting.  Talk to your pediatrician regarding the use of this medicine in children. Special care may be needed.  What side effects may I notice from receiving this medicine?  Side effects that you should report to your doctor or health care professional as soon as possible:    allergic reactions like skin rash, itching or hives, swelling of the face, lips, or tongue    breathing problems  Side effects that usually do not require medical attention (report to your doctor or health care professional if they continue or are bothersome):    dizziness    fever    headache    nausea    tiredness  What may interact with this medicine?  Interactions are not expected.  What if I miss a dose?  This does not apply; this medicine is not for regular use.  Where should I keep my medicine?  This drug is given in a hospital or clinic and will not be stored at home.  What should I tell my health care provider before I take this medicine?  They need to know if you have any of these conditions:    heart disease    an unusual or allergic reaction to bezlotoxumab, other medicines, foods, dyes, or preservatives    pregnant or trying to get pregnant    breast-feeding  What should I watch for while using this medicine?  Tell your doctor or healthcare professional if your symptoms do not start to get better or if they get worse.  Do not  treat diarrhea with over the counter products.  NOTE:This sheet is a summary. It may not cover all possible information. If you have questions about this medicine, talk to your doctor, pharmacist, or health care provider. Copyright  2018 [employer]          Additional information: you received an infusion of Zinplava via IV today.       We look forward in seeing you on your next appointment here at [facility].  Please don t hesitate to call us at [phone] to reschedule any of your appointments or to speak with one of the [facility] registered nurses.  It was a pleasure taking care of you today.    Sincerely,    [facility]  [address] [place] [address]  [address]  Phone:  [phone]

## 2018-06-13 ENCOUNTER — TELEPHONE (OUTPATIENT)
Dept: GASTROENTEROLOGY | Facility: CLINIC | Age: 83
End: 2018-06-13

## 2018-06-13 DIAGNOSIS — R19.7 DIARRHEA, UNSPECIFIED TYPE: Primary | ICD-10-CM

## 2018-06-13 NOTE — TELEPHONE ENCOUNTER
Ms. Kala Olmedo took a set of FMT capsules (her 3rd try) yesterday, 6.12.18. Her Zinplava infusion was 6.7.18; last dose of vancomycin 6.9.18.has had a rough first day and called about taking Imodium.    She has history of multiple FMT failures.  She reports that while on vancomycin she had ~ 4 loose BMs per day.  Yesterday she had 5 and they were looser.  She had an accident this morning.  There was no C. Difficile smell.      She was advised that at this time Imodium is okay if she needs to go out of the house. A standing order for C. Difficile testing is placed.  It should be sent to her [facility] in [place].

## 2018-06-14 ENCOUNTER — TELEPHONE (OUTPATIENT)
Dept: GASTROENTEROLOGY | Facility: CLINIC | Age: 83
End: 2018-06-14

## 2018-06-14 NOTE — TELEPHONE ENCOUNTER
The patient took one Imodium in the morning.  She had 2 small bowel movements.  She denies abdominal pain and feels better today.

## 2018-06-27 ENCOUNTER — TRANSFERRED RECORDS (OUTPATIENT)
Dept: HEALTH INFORMATION MANAGEMENT | Facility: CLINIC | Age: 83
End: 2018-06-27

## 2018-06-30 ENCOUNTER — TELEPHONE (OUTPATIENT)
Dept: GASTROENTEROLOGY | Facility: CLINIC | Age: 83
End: 2018-06-30

## 2018-06-30 NOTE — TELEPHONE ENCOUNTER
"I called Ms. Olmedo to follow-up on the positive C. Difficile test from [facility].  She had a \"good day\" yesterday, but had 5 mushy BMs today.  She took some Imodium and is doing well now.    Ms. Kala Olmedo took a set of FMT capsules (her 3rd try) yesterday, 6.12.18. Her Zinplava infusion was 6.7.18; last dose of vancomycin 6.9.18.has had a rough first day and called about taking Imodium.    Since she is still covered by Zinplava, she is okay to take Imodium.  Calprotectin is still pending.  Will continue to follow -- it is premature to start vancomycin.  "

## 2018-07-02 ENCOUNTER — TELEPHONE (OUTPATIENT)
Dept: GASTROENTEROLOGY | Facility: CLINIC | Age: 83
End: 2018-07-02

## 2018-07-09 ENCOUNTER — TELEPHONE (OUTPATIENT)
Dept: GASTROENTEROLOGY | Facility: CLINIC | Age: 83
End: 2018-07-09

## 2018-07-30 ENCOUNTER — TELEPHONE (OUTPATIENT)
Dept: GASTROENTEROLOGY | Facility: CLINIC | Age: 83
End: 2018-07-30

## 2018-07-30 ENCOUNTER — TRANSFERRED RECORDS (OUTPATIENT)
Dept: HEALTH INFORMATION MANAGEMENT | Facility: CLINIC | Age: 83
End: 2018-07-30

## 2018-07-30 DIAGNOSIS — R19.7 DIARRHEA, UNSPECIFIED TYPE: Primary | ICD-10-CM

## 2018-07-30 NOTE — TELEPHONE ENCOUNTER
The patient complains of foul-smelling, loose stools.  They don't wake her up during the night.  She is avoiding Imodium.    The plan is to re-test for C. Difficile and calprotectin.

## 2018-08-06 ENCOUNTER — TELEPHONE (OUTPATIENT)
Dept: GASTROENTEROLOGY | Facility: CLINIC | Age: 83
End: 2018-08-06

## 2018-08-06 DIAGNOSIS — A04.71 RECURRENT CLOSTRIDIUM DIFFICILE DIARRHEA: Primary | ICD-10-CM

## 2018-08-06 NOTE — TELEPHONE ENCOUNTER
The patient reports 5-6 loose BMs in the mornings and usually feels better in the afternoons.  There is really no improvement in symptoms.    Labs still demonstrate positive C. Difficile testing and increased calprotectin (although it is somewhat improved).    We will start vancomycin and she can decide whether to continue it indefinitely or proceed to colonoscopic FMT.

## 2018-08-08 ENCOUNTER — TELEPHONE (OUTPATIENT)
Dept: GASTROENTEROLOGY | Facility: CLINIC | Age: 83
End: 2018-08-08

## 2018-08-08 NOTE — TELEPHONE ENCOUNTER
Spoke with [facility] that Dr Cho will have patient take vancomycin until patient decides to have FMT colonoscopy. Pharmacy verbalized understanding     Ricki ROTHMAN

## 2018-08-08 NOTE — TELEPHONE ENCOUNTER
[facility] Call Center    Phone Message    May a detailed message be left on voicemail: yes    Reason for Call: Other: what is the amount of time the patient should be taknig the medication? please advise pharmacy     Action Taken: Message routed to:  Adult Clinics: Gastroenterology (GI) p [id number]

## 2018-08-14 ENCOUNTER — TELEPHONE (OUTPATIENT)
Dept: GASTROENTEROLOGY | Facility: CLINIC | Age: 83
End: 2018-08-14

## 2018-08-14 NOTE — TELEPHONE ENCOUNTER
[facility] Call Center    Phone Message    May a detailed message be left on voicemail: yes    Reason for Call: Patient is calling regarding questions she has about a colonoscopy.  Patient would like to be seen 08/27/18  Please advise [phone]    Action Taken: Message routed to:  Adult Clinics: Gastroenterology (GI) p [id number]

## 2018-08-15 NOTE — TELEPHONE ENCOUNTER
Patient said she has been on Vanco for 7 days and would like to know if Dr. Cho is planning on her have the FMT.  Nurse will pass a message to Dr. Cho.    Alysa Churchill RN, BSN, PHN  [facility]  GI/Gen Surg/Hepatology Care Coordinator

## 2018-08-15 NOTE — TELEPHONE ENCOUNTER
8.15.18  Pt calling again.  No one called her back from yesterdays message.  Please call. [phone].  Has a question regarding getting a colonoscopy.

## 2018-08-20 ENCOUNTER — TELEPHONE (OUTPATIENT)
Dept: GASTROENTEROLOGY | Facility: CLINIC | Age: 83
End: 2018-08-20

## 2018-08-20 NOTE — TELEPHONE ENCOUNTER
I called the patient 8.17.  The plan is to stay on vancomycin until colonoscopic FMT.  Daughter is coming 8.26.  She also reported burning with urination and a groin rash.  Her primary has given her Desitin and triamcinolone cream.  It sounds like a yeast infection and I cautioned against steroids.  UA/UC results are not visibile yet in Care Everywhere.

## 2018-08-31 ENCOUNTER — TELEPHONE (OUTPATIENT)
Dept: GASTROENTEROLOGY | Facility: CLINIC | Age: 83
End: 2018-08-31

## 2018-08-31 DIAGNOSIS — A04.71 RECURRENT CLOSTRIDIUM DIFFICILE DIARRHEA: Primary | ICD-10-CM

## 2018-08-31 NOTE — TELEPHONE ENCOUNTER
----- Message from Stefano Cho MD sent at 8/31/2018 12:59 PM CDT -----  Ms. Olmedo should have an FMT colonoscopy 9.10.18 with me.  She is expecting directions.  Thank you, Pasha

## 2018-08-31 NOTE — TELEPHONE ENCOUNTER
Nurse called patient and went over instructions with her and updated her that the Endoscopy Center will call her to schedule the time.    She verbalized understanding and will call if she has any additional questions.    Alysa Churchill RN, BSN, PHN  [facility]  GI/Gen Surg/Hepatology Care Coordinator

## 2018-08-31 NOTE — TELEPHONE ENCOUNTER
I am writing you with the instructions for the Moviprep that you will be taking to prepare for Fecal Transplant by Colonoscopy.    This procedure will take place at:  [facility], [address] [address]  [address] on the 1st floor ([address])      You have been scheduled for: Colonoscopy FMT 9/10/18  Please check in by: You will receive a call to schedule time    Continue to take your prescribed Vancomycin through the Saturday prior to your procedure. Your last dose of Vancomycin should be taken at bedtime on Saturday 9/8/18.     Start your Moviprep on: Sunday 9/9/18    You are scheduled for your two month follow up on 11/7/18 at 11:30 am.     Please follow the attached directions and call me with any questions.    Thank you,  Alysa Churchill RN, BSN  [facility]  [phone]

## 2018-09-04 ENCOUNTER — TELEPHONE (OUTPATIENT)
Dept: GASTROENTEROLOGY | Facility: CLINIC | Age: 83
End: 2018-09-04

## 2018-09-04 DIAGNOSIS — Z12.11 ENCOUNTER FOR SCREENING COLONOSCOPY: Primary | ICD-10-CM

## 2018-09-04 NOTE — TELEPHONE ENCOUNTER
Patient scheduled for colonoscopy     Indication for procedure. Recurrent Clostridium difficile diarrhea     Referring Provider. Tammie     ? No     Arrival time verified? 120 pm     Facility location verified? [address] , 1st floor     Instructions given regarding prep and procedure. Prep reviewed and RN answered all questions. Transportation policy reviewed and verbalized understanding     Prep Type Golytely.     Are you taking any anticoagulants or blood thinners? Aspirin     Instructions given? Yes     Electronic implanted devices? Denies     Pre procedure teaching completed? Yes    Transportation from procedure? Yes, daughter     H&P / Pre op physical completed? N/A    Harsha Lau RN

## 2018-09-10 ENCOUNTER — HOSPITAL ENCOUNTER (OUTPATIENT)
Facility: CLINIC | Age: 83
Discharge: HOME | End: 2018-09-10
Attending: INTERNAL MEDICINE | Admitting: INTERNAL MEDICINE
Payer: MEDICARE

## 2018-09-10 VITALS
RESPIRATION RATE: 11 BRPM | WEIGHT: 122 LBS | BODY MASS INDEX: 22.45 KG/M2 | HEIGHT: 62 IN | DIASTOLIC BLOOD PRESSURE: 79 MMHG | OXYGEN SATURATION: 90 % | SYSTOLIC BLOOD PRESSURE: 156 MMHG

## 2018-09-10 LAB — COLONOSCOPY: NORMAL

## 2018-09-10 PROCEDURE — 25000128 H RX IP 250 OP 636: Performed by: INTERNAL MEDICINE

## 2018-09-10 PROCEDURE — G0500 MOD SEDAT ENDO SERVICE >5YRS: HCPCS | Performed by: INTERNAL MEDICINE

## 2018-09-10 PROCEDURE — 45380 COLONOSCOPY AND BIOPSY: CPT | Mod: XU | Performed by: INTERNAL MEDICINE

## 2018-09-10 PROCEDURE — 25000132 ZZH RX MED GY IP 250 OP 250 PS 637: Mod: GY | Performed by: INTERNAL MEDICINE

## 2018-09-10 PROCEDURE — 88305 TISSUE EXAM BY PATHOLOGIST: CPT | Performed by: INTERNAL MEDICINE

## 2018-09-10 PROCEDURE — 99153 MOD SED SAME PHYS/QHP EA: CPT | Performed by: INTERNAL MEDICINE

## 2018-09-10 PROCEDURE — 44799 UNLISTED PX SMALL INTESTINE: CPT

## 2018-09-10 RX ORDER — LIDOCAINE 40 MG/G
CREAM TOPICAL
Status: DISCONTINUED | OUTPATIENT
Start: 2018-09-10 | End: 2018-09-10 | Stop reason: HOSPADM

## 2018-09-10 RX ORDER — FENTANYL CITRATE 50 UG/ML
INJECTION, SOLUTION INTRAMUSCULAR; INTRAVENOUS PRN
Status: DISCONTINUED | OUTPATIENT
Start: 2018-09-10 | End: 2018-09-10 | Stop reason: HOSPADM

## 2018-09-10 RX ORDER — SIMETHICONE
LIQUID (ML) MISCELLANEOUS PRN
Status: DISCONTINUED | OUTPATIENT
Start: 2018-09-10 | End: 2018-09-10 | Stop reason: HOSPADM

## 2018-09-10 NOTE — DISCHARGE INSTRUCTIONS
Discharge Instructions after Colonoscopy      Today you had a Colonoscopy     Activity and Diet  You were given medicine for pain. You may be dizzy or sleepy.  For 24 hours:    Do not drive or use heavy equipment.    Do not make important decisions.    Do not drink any alcohol.  You may return to your normal diet and medicines.    Discomfort    Air was placed in your colon during the exam in order to see it. Walking helps to pass the air.    You may take Tylenol (acetaminophen) for pain unless your doctor has told you not to.  Do not take aspirin or ibuprofen (Advil, Motrin, or other anti-inflammatory  drugs) for 3 days.    Follow-up   We took small tissue samples to study. Your doctor will call you with the results  within two weeks.    When to call:    Call right away if you have:    Unusual pain in belly or chest pain not relieved with passing air.    More than 1 to 2 Tablespoons of bleeding from your rectum.    Fever above 100.6  F (37.5  C).    If you have severe pain, bleeding, or shortness of breath, go to an emergency room.    If you have questions, call:  Monday to Friday, 7 a.m. to 4:30 p.m.  Endoscopy: [phone] (We may have to call you back)    After hours  Hospital: [phone] (Ask for the GI fellow on call)

## 2018-09-10 NOTE — OR NURSING
Colonoscopy with FMT and biopsies, pt tolerated well. VSS On 2L NC, weaned to room air. Specimens sent to lab.

## 2018-09-10 NOTE — LETTER
September 15, 2018      Kala Olmedo                                                                [facility] [facility] [address]          Dear Ms. Olmedo,    The biopsies taken at the time of your colonoscopy were unremarkable, which is reassuring that we're not dealing with some form of inflammatory bowel disease such as microscopic colitis.    Sincerely,    Stefano Cho MD  Results for orders placed or performed during the hospital encounter of 09/10/18   COLONOSCOPY   Result Value Ref Range    COLONOSCOPY       [facility], [address] [place] [address] [phone]     Endoscopy Department  _______________________________________________________________________________  Patient Name: Kala Olmedo           Procedure Date: 9/10/2018 2:35 PM  MRN: [id number]                       Account Number: [id number]  YOB: 1930             Admit Type: Outpatient  Age: 87                                Gender: Female  Note Status: Finalized                Attending MD: Stefano Cho MD  Total Sedation Time:                    _______________________________________________________________________________     Procedure:           Colonoscopy  Indications:         Chronic diarrhea  Providers:           Stefano Cho MD, Fern Rutledge RN  Patient Profile:     This is an 87 year old female with multiply recurrent C.                        difficile infection, unable to clear with antibiotics,                        Zinplava, and s/p failed capsule FMT  x 2.  Referring MD:        Eleonora Grier MD  Medicines:           Midazolam 2 mg IV, Fentanyl 100 micrograms IV  Complications:       No immediate complications.  _______________________________________________________________________________  Procedure:           Pre-Anesthesia Assessment:                       - Mental Status Examination: alert and oriented. Airway                         Examination: normal oropharyngeal airway and neck                        mobility. Respiratory Examination: clear to                        auscultation. CV Examination: normal. Abdominal                        Examination: bowel sounds present, abdomen soft and                        non-tender, no masses or organomegaly noted.                       - ASA Grade Assessment: II - A patient with mild                        systemic disease.                       - After reviewing the risks and benefits, the patient                        was deemed in satisfactory condition to undergo the                         procedure.                       - The anesthesia plan was to use moderate                        sedation/analgesia (conscious sedation).                       - Immediately prior to administration of medications,                        the patient was re-assessed for adequacy to receive                        sedatives.                       - Sedation was administered by an endoscopy nurse. The                        sedation level attained was moderate.                       - The heart rate, respiratory rate, oxygen saturations,                        blood pressure, adequacy of pulmonary ventilation, and                        response to care were monitored throughout the procedure.                       - The physical status of the patient was re-assessed                        after the procedure.                       After obtaining informed consent, the colonoscope was                        passed under direct vision. Throughout the procedure,                         the patient's blood pressure, pulse, and oxygen                        saturations were monitored continuously. The Colonoscope                        was introduced through the anus and advanced to the                        terminal ileum. The colonoscopy was performed without                        difficulty. The patient tolerated  the procedure well.                        The quality of the bowel preparation was excellent.                                                                                   Findings:       The terminal ileum appeared normal. Microbiota was infused.       Multiple small-mouthed diverticula were found in the sigmoid colon.       Normal mucosa was found in the entire colon. Biopsies were taken with a        cold forceps for histology.       Poor squeeze on rectal exam.                                                                                   Impression:          - The examined portion of the ileum was normal.                       - Divert iculosis in the sigmoid colon.                       - Normal mucosa in the entire examined colon. Biopsied.                       - FMT was done.  Recommendation:      - Return to my office in 2 months.                                                                                     Signed Electronically by Stefano Sabillon MD  _____________________  Stefano Sabillon MD  9/10/2018 3:38:48 PM  I was physically present for the entire viewing portion of the exam.  __________________________  Signature of teaching physician  Zakiya/Cari Sabillon MD  Number of Addenda: 0    Note Initiated On: 9/10/2018 2:35 PM     Surgical pathology exam   Result Value Ref Range    Copath Report       Patient Name: NENO CORCORAN  MR#: [id number]  Specimen #: [id number]  Collected: 9/10/2018  Received: 9/10/2018  Reported: 9/11/2018 15:39  Ordering Phy(s): STEFANO SABILLON    For improved result formatting, select 'View Enhanced Report Format' under   Linked Documents section.    SPECIMEN(S):  Colon biopsies, random    FINAL DIAGNOSIS:  COLON BIOPSIES, RANDOM:  - Colonic mucosa with no significant histologic abnormality  - Negative for intraepithelial lymphocytosis or deposition of   subepithelial thick collagenous band  - No evidence of pseudomembranous colitis    I have  "personally reviewed all specimens and/or slides, including the   listed special stains, and used them  with my medical judgement to determine or confirm the final diagnosis.    Electronically signed out by:  Anitha Limon M.D., PhD, [facility]    CLINICAL HISTORY:  Recurrent Clostridium difficile diarrhea  GROSS:  The specimen is received in formalin with proper patient identification,   labeled \"random coloni c biopsies\".  The specimen consists of multiple pieces of pink-tan soft tissue ranging   in size from 0.1-0.3 cm in greatest  dimension, which are entirely submitted in cassette A1. (Dictated by:   Yuliet Mathews 9/10/2018 04:26 PM)    MICROSCOPIC:  Microscopic examination was performed.    CPT Codes:  A: 18470-HY4    TESTING LAB LOCATION:  [facility], [address]   [address]  [phone]    COLLECTION SITE:  Client: [facility]  Location: [facility] ([facility])    Resident  [id number]       "

## 2018-09-10 NOTE — IP AVS SNAPSHOT
[facility], [place], Endoscopy    [address] [facility] [address]    Phone:  [phone]                                       After Visit Summary   9/10/2018    Kala Olmedo    MRN: [id number]           After Visit Summary Signature Page     I have received my discharge instructions, and my questions have been answered. I have discussed any challenges I see with this plan with the nurse or doctor.    ..........................................................................................................................................  Patient/Patient Representative Signature      ..........................................................................................................................................  Patient Representative Print Name and Relationship to Patient    ..................................................               ................................................  Date                                            Time    ..........................................................................................................................................  Reviewed by Signature/Title    ...................................................              ..............................................  Date                                                            Time          22EPIC Rev 08/18

## 2018-09-10 NOTE — IP AVS SNAPSHOT
MRN:[id number]                      After Visit Summary   9/10/2018    Kala Olmedo    MRN: [id number]           Thank you!     Thank you for choosing [facility] for your care. Our goal is always to provide you with excellent care. Hearing back from our patients is one way we can continue to improve our services. Please take a few minutes to complete the written survey that you may receive in the mail after you visit with us. Thank you!        Patient Information     Date Of Birth          12/12/1930        About your hospital stay     You were admitted on:  September 10, 2018 You last received care in the:  [facility], Endoscopy    You were discharged on:  September 10, 2018       Who to Call     For medical emergencies, please call 911.  For non-urgent questions about your medical care, please call your primary care provider or clinic, [phone]  For questions related to your surgery, please call your surgery clinic        Attending Provider     Provider Specialty    Stefano Cho MD Gastroenterology       Primary Care Provider Office Phone # Fax #    Juan Carlos Parada [phone]      Your next 10 appointments already scheduled     Nov 07, 2018 11:30 AM CST   Return Visit with Stefano Cho MD   [facility]    [id number] [place] [id number] [phone]              Further instructions from your care team       Discharge Instructions after Colonoscopy      Today you had a Colonoscopy     Activity and Diet  You were given medicine for pain. You may be dizzy or sleepy.  For 24 hours:    Do not drive or use heavy equipment.    Do not make important decisions.    Do not drink any alcohol.  You may return to your normal diet and medicines.    Discomfort    Air was placed in your colon during the exam in order to see it. Walking helps to pass the air.    You may take Tylenol (acetaminophen) for pain unless  "your doctor has told you not to.  Do not take aspirin or ibuprofen (Advil, Motrin, or other anti-inflammatory  drugs) for 3 days.    Follow-up   We took small tissue samples to study. Your doctor will call you with the results  within two weeks.    When to call:    Call right away if you have:    Unusual pain in belly or chest pain not relieved with passing air.    More than 1 to 2 Tablespoons of bleeding from your rectum.    Fever above 100.6  F (37.5  C).    If you have severe pain, bleeding, or shortness of breath, go to an emergency room.    If you have questions, call:  Monday to Friday, 7 a.m. to 4:30 p.m.  Endoscopy: [phone] (We may have to call you back)    After hours  Hospital: [phone] (Ask for the GI fellow on call)    Pending Results     Date and Time Order Name Status Description    9/10/2018 1524 Surgical pathology exam In process             Admission Information     Date & Time Provider Department Dept. Phone    9/10/2018 Stefano Cho MD [facility] [place], Endoscopy [phone]      Your Vitals Were     Blood Pressure Respirations Height Weight Pulse Oximetry BMI (Body Mass Index)    123/80 18 1.575 m (5' 2\") 55.3 kg (122 lb) 94% 22.31 kg/m2      [facility] Information     [employer] lets you send messages to your doctor, view your test results, renew your prescriptions, schedule appointments and more. To sign up, go to [email or web address] . Click on \"Log in\" on the left side of the screen, which will take you to the Welcome page. Then click on \"Sign up Now\" on the right side of the page.     You will be asked to enter the access code listed below, as well as some personal information. Please follow the directions to create your username and password.     Your access code is: [email or web address]  Expires: 2018  3:44 PM     Your access code will  in 90 days. If you need help or a new code, please call your [facility] or [phone].        Care EveryWhere ID     This is your " Care EveryWhere ID. This could be used by other organizations to access your [facility] medical records  [id number]        Equal Access to Services     MICHELLE CARPENTER : Hadii aad ku hadngoziry Marleniali, wahipolitoda lutoanvinnyha, ita kamaria guadalupeda emelia, gamaliel claudein hayaajoni marinellimoe medina laBertawon griffith. So [facility] [phone].    ATENCIÓN: Si habla español, tiene a esparza disposición servicios gratuitos de asistencia lingüística. Llame al [phone].    We comply with applicable federal civil rights laws and [place] laws. We do not discriminate on the basis of race, color, national origin, age, disability, sex, sexual orientation, or gender identity.               Review of your medicines      UNREVIEWED medicines. Ask your doctor about these medicines        Dose / Directions    ALPRAZolam 0.25 MG tablet   Commonly known as:  XANAX        TK 1 T PO  TID PRA   Refills:  0       anastrozole 1 MG tablet   Commonly known as:  ARIMIDEX        TK 1 T PO QD   Refills:  0       ascorbic acid 1000 MG Tabs   Commonly known as:  vitamin C        Dose:  1000 mg   Take 1,000 mg by mouth   Refills:  0       aspirin 81 MG EC tablet        TK 1 T PO D   Refills:  0       escitalopram 5 MG tablet   Commonly known as:  LEXAPRO        TK 1 T PO D   Refills:  0       ethambutol 400 MG tablet   Commonly known as:  MYAMBUTOL        Refills:  0       fish oil-omega-3 fatty acids 1000 MG capsule        Refills:  0       INCRUSE ELLIPTA 62.5 MCG/INH inhaler   Generic drug:  umeclidinium        USE 1 PUFF D   Refills:  0       ipratropium - albuterol 0.5 mg/2.5 mg/3 mL 0.5-2.5 (3) MG/3ML neb solution   Commonly known as:  DUONEB        NEBULIZE 1 VIAL Q 4 H PRF COPD   Refills:  0       levalbuterol 1.25 MG/3ML neb solution   Commonly known as:  XOPENEX        NEBULIZE 1 VIAL QID FOR COPD   Refills:  0       loperamide 2 MG capsule   Commonly known as:  IMODIUM        TK 1 TO 2 CS PO Q 4 H PRF DIARRHEA   Refills:  0       MULTI-VITAMINS Tabs        Refills:  0        mupirocin 2 % ointment   Commonly known as:  BACTROBAN        AVA TOPICALLY AA BID PRF SUPERFICIAL SKIN INFECTIONS   Refills:  0       Q- MG tablet   Generic drug:  acetaminophen        TK 1 TO 2 TS PO Q 4 H PRN P. USE 1 T FOR PAIN SCALE 1 TO 5 AND USE 2 TS FOR PAIN SCALE 6 TO 10.   Refills:  0       ramipril 2.5 MG capsule   Commonly known as:  ALTACE        Refills:  0       * VANCOMYCIN HCL PO        Dose:  125 mg   Take 125 mg by mouth   Refills:  0       * vancomycin 50 mg/mL Liqd solution   Commonly known as:  VANOCIN   Used for:  Recurrent Clostridium difficile diarrhea        Dose:  125 mg   Take 2.5 mLs (125 mg) by mouth 4 times daily   Quantity:  100 mL   Refills:  1       * vancomycin 125 MG capsule   Commonly known as:  VANCOCIN   Used for:  Colitis due to Clostridium difficile        Dose:  125 mg   Take 1 capsule (125 mg) by mouth daily   Quantity:  30 capsule   Refills:  0       * vancomycin (SUGAR-FREE) 50 mg/mL Liqd solution   Commonly known as:  VANOCIN   Used for:  Recurrent Clostridium difficile diarrhea        Dose:  125 mg   Take 2.5 mLs (125 mg) by mouth 4 times daily   Quantity:  150 mL   Refills:  1       vitamin D 67952 UNIT capsule   Commonly known as:  ERGOCALCIFEROL        Refills:  0       * Notice:  This list has 4 medication(s) that are the same as other medications prescribed for you. Read the directions carefully, and ask your doctor or other care provider to review them with you.             Protect others around you: Learn how to safely use, store and throw away your medicines at www.disposemymeds.org.             Medication List: This is a list of all your medications and when to take them. Check marks below indicate your daily home schedule. Keep this list as a reference.      Medications           Morning Afternoon Evening Bedtime As Needed    ALPRAZolam 0.25 MG tablet   Commonly known as:  XANAX   TK 1 T PO  TID PRA                                anastrozole 1 MG  tablet   Commonly known as:  ARIMIDEX   TK 1 T PO QD                                ascorbic acid 1000 MG Tabs   Commonly known as:  vitamin C   Take 1,000 mg by mouth                                aspirin 81 MG EC tablet   TK 1 T PO D                                escitalopram 5 MG tablet   Commonly known as:  LEXAPRO   TK 1 T PO D                                ethambutol 400 MG tablet   Commonly known as:  MYAMBUTOL                                fish oil-omega-3 fatty acids 1000 MG capsule                                INCRUSE ELLIPTA 62.5 MCG/INH inhaler   USE 1 PUFF D   Generic drug:  umeclidinium                                ipratropium - albuterol 0.5 mg/2.5 mg/3 mL 0.5-2.5 (3) MG/3ML neb solution   Commonly known as:  DUONEB   NEBULIZE 1 VIAL Q 4 H PRF COPD                                levalbuterol 1.25 MG/3ML neb solution   Commonly known as:  XOPENEX   NEBULIZE 1 VIAL QID FOR COPD                                loperamide 2 MG capsule   Commonly known as:  IMODIUM   TK 1 TO 2 CS PO Q 4 H PRF DIARRHEA                                MULTI-VITAMINS Tabs                                mupirocin 2 % ointment   Commonly known as:  BACTROBAN   AVA TOPICALLY AA BID PRF SUPERFICIAL SKIN INFECTIONS                                Q- MG tablet   TK 1 TO 2 TS PO Q 4 H PRN P. USE 1 T FOR PAIN SCALE 1 TO 5 AND USE 2 TS FOR PAIN SCALE 6 TO 10.   Generic drug:  acetaminophen                                ramipril 2.5 MG capsule   Commonly known as:  ALTACE                                * VANCOMYCIN HCL PO   Take 125 mg by mouth                                * vancomycin 50 mg/mL Liqd solution   Commonly known as:  VANOCIN   Take 2.5 mLs (125 mg) by mouth 4 times daily                                * vancomycin 125 MG capsule   Commonly known as:  VANCOCIN   Take 1 capsule (125 mg) by mouth daily                                * vancomycin (SUGAR-FREE) 50 mg/mL Liqd solution   Commonly known as:  VANOCIN    Take 2.5 mLs (125 mg) by mouth 4 times daily                                vitamin D 35914 UNIT capsule   Commonly known as:  ERGOCALCIFEROL                                * Notice:  This list has 4 medication(s) that are the same as other medications prescribed for you. Read the directions carefully, and ask your doctor or other care provider to review them with you.

## 2018-09-11 LAB — COPATH REPORT: NORMAL

## 2018-09-21 ENCOUNTER — TELEPHONE (OUTPATIENT)
Dept: GASTROENTEROLOGY | Facility: CLINIC | Age: 83
End: 2018-09-21

## 2018-09-21 ENCOUNTER — TRANSFERRED RECORDS (OUTPATIENT)
Dept: HEALTH INFORMATION MANAGEMENT | Facility: CLINIC | Age: 83
End: 2018-09-21

## 2018-09-21 DIAGNOSIS — A04.71 RECURRENT CLOSTRIDIUM DIFFICILE DIARRHEA: Primary | ICD-10-CM

## 2018-09-21 NOTE — TELEPHONE ENCOUNTER
[facility] Call Center    Phone Message    May a detailed message be left on voicemail: yes    Reason for Call: Other: Patient returned call to Alysa, Patient hoping for a call today.      Action Taken: Message routed to:  Adult Clinics: Gastroenterology (GI) p [phone]

## 2018-09-21 NOTE — TELEPHONE ENCOUNTER
Nurse returned call to patient, she is still having symptoms. She had 10+ stools per day and today she is feeling better but is still having loose stools.   She wants to check to see if she has Cdiff again, it has been 11 days since her FMT.  Nurse will sent over orders to [facility].    Alysa Churchill RN, BSN, PHN  [facility]  GI/Gen Surg/Hepatology Care Coordinator

## 2018-09-25 NOTE — TELEPHONE ENCOUNTER
Nurse called patient, she said she submitted the stool samples last week. Nurse called [facility] for results, they will fax them over.    Alysa Churchill RN, BSN, PHN  [facility]  GI/Gen Surg/Hepatology Care Coordinator

## 2018-09-27 ENCOUNTER — TRANSFERRED RECORDS (OUTPATIENT)
Dept: HEALTH INFORMATION MANAGEMENT | Facility: CLINIC | Age: 83
End: 2018-09-27

## 2018-09-28 NOTE — TELEPHONE ENCOUNTER
Nurse called and updated patient that her Cdiff was negative. She will follow up with Dr. Coh on 11/7.    Alysa Churchill RN, BSN, PHN  [facility]  GI/Gen Surg/Hepatology Care Coordinator

## 2018-10-13 DIAGNOSIS — R19.7 DIARRHEA, UNSPECIFIED TYPE: Primary | ICD-10-CM

## 2018-10-15 ENCOUNTER — TELEPHONE (OUTPATIENT)
Dept: GASTROENTEROLOGY | Facility: CLINIC | Age: 83
End: 2018-10-15

## 2018-10-15 NOTE — TELEPHONE ENCOUNTER
The patient reported some loose stools over the weekend, but took Imodium and was better this morning.    Standing orders for C. Difficile and calprotectin should be in place at the [facility].    I reassured the patient that some irregularity is common after FMT for C. Difficile infection.

## 2018-11-07 ENCOUNTER — OFFICE VISIT (OUTPATIENT)
Dept: GASTROENTEROLOGY | Facility: CLINIC | Age: 83
End: 2018-11-07
Payer: MEDICARE

## 2018-11-07 VITALS — OXYGEN SATURATION: 99 % | HEART RATE: 98 BPM | SYSTOLIC BLOOD PRESSURE: 147 MMHG | DIASTOLIC BLOOD PRESSURE: 80 MMHG

## 2018-11-07 DIAGNOSIS — A04.71 RECURRENT CLOSTRIDIUM DIFFICILE DIARRHEA: Primary | ICD-10-CM

## 2018-11-07 PROCEDURE — 99213 OFFICE O/P EST LOW 20 MIN: CPT | Performed by: INTERNAL MEDICINE

## 2018-11-07 ASSESSMENT — PAIN SCALES - GENERAL: PAINLEVEL: NO PAIN (0)

## 2018-11-07 NOTE — MR AVS SNAPSHOT
After Visit Summary   2018    Kala Olmedo    MRN: [id number]           Patient Information     Date Of Birth          1930        Visit Information        Provider Department      2018 11:30 AM Stefano Cho MD [facility]        Today's Diagnoses     Recurrent Clostridium difficile diarrhea    -  1       Follow-ups after your visit        Who to contact     If you have questions or need follow up information about today's clinic visit or your schedule please contact [facility] directly at [phone].  Normal or non-critical lab and imaging results will be communicated to you by [employer], letter or phone within 4 business days after the clinic has received the results. If you do not hear from us within 7 days, please contact the clinic through [employer] or phone. If you have a critical or abnormal lab result, we will notify you by phone as soon as possible.  Submit refill requests through [employer] or call your pharmacy and they will forward the refill request to us. Please allow 3 business days for your refill to be completed.          Additional Information About Your Visit        MyChart Information     [employer] is an electronic gateway that provides easy, online access to your medical records. With [employer], you can request a clinic appointment, read your test results, renew a prescription or communicate with your care team.     To sign up for [employer] visit the website at [email or web address]   You will be asked to enter the access code listed below, as well as some personal information. Please follow the directions to create your username and password.     Your access code is: [email or web address]  Expires: 2018  2:44 PM     Your access code will  in 90 days. If you need help or a new code, please contact your [facility] or call [phone] for assistance.        Care EveryWhere ID     This is your Care  EveryWhere ID. This could be used by other organizations to access your [facility] medical records  [id number]        Your Vitals Were     Pulse Pulse Oximetry                98 99%           Blood Pressure from Last 3 Encounters:   11/07/18 147/80   09/10/18 156/79   06/07/18 135/63    Weight from Last 3 Encounters:   09/10/18 55.3 kg (122 lb)   06/07/18 56.2 kg (123 lb 14.4 oz)   05/16/18 56.6 kg (124 lb 12.8 oz)              Today, you had the following     No orders found for display       Primary Care Provider Office Phone # Fax #    Juan Carlos Parada [phone]       [facility] [address]  [facility] [address]        Equal Access to Services     MICHELLE CARPENTER : Hadii aad ku hadasho Soomaali, waaxda luqadaha, qaybta kaalmada adeegyada, gamaliel avalos . So [facility] [phone].    ATENCIÓN: Si habla español, tiene a esparza disposición servicios gratuitos de asistencia lingüística. Llame al [phone].    We comply with applicable federal civil rights laws and [place] laws. We do not discriminate on the basis of race, color, national origin, age, disability, sex, sexual orientation, or gender identity.            Thank you!     Thank you for choosing [facility]  for your care. Our goal is always to provide you with excellent care. Hearing back from our patients is one way we can continue to improve our services. Please take a few minutes to complete the written survey that you may receive in the mail after your visit with us. Thank you!             Your Updated Medication List - Protect others around you: Learn how to safely use, store and throw away your medicines at www.disposemymeds.org.          This list is accurate as of 11/7/18 11:59 PM.  Always use your most recent med list.                   Brand Name Dispense Instructions for use Diagnosis    ALPRAZolam 0.25 MG tablet    XANAX     TK 1 T PO  TID PRA        anastrozole 1 MG  tablet    ARIMIDEX     TK 1 T PO QD        ascorbic acid 1000 MG Tabs    vitamin C     Take 1,000 mg by mouth        aspirin 81 MG EC tablet      TK 1 T PO D        escitalopram 5 MG tablet    LEXAPRO     TK 1 T PO D        ethambutol 400 MG tablet    MYAMBUTOL          fish oil-omega-3 fatty acids 1000 MG capsule           INCRUSE ELLIPTA 62.5 MCG/INH inhaler   Generic drug:  umeclidinium      USE 1 PUFF D        ipratropium - albuterol 0.5 mg/2.5 mg/3 mL 0.5-2.5 (3) MG/3ML neb solution    DUONEB     NEBULIZE 1 VIAL Q 4 H PRF COPD        levalbuterol 1.25 MG/3ML neb solution    XOPENEX     NEBULIZE 1 VIAL QID FOR COPD        loperamide 2 MG capsule    IMODIUM     TK 1 TO 2 CS PO Q 4 H PRF DIARRHEA        MULTI-VITAMINS Tabs           mupirocin 2 % ointment    BACTROBAN     AVA TOPICALLY AA BID PRF SUPERFICIAL SKIN INFECTIONS        Q- MG tablet   Generic drug:  acetaminophen      TK 1 TO 2 TS PO Q 4 H PRN P. USE 1 T FOR PAIN SCALE 1 TO 5 AND USE 2 TS FOR PAIN SCALE 6 TO 10.        ramipril 2.5 MG capsule    ALTACE          * VANCOMYCIN HCL PO      Take 125 mg by mouth        * vancomycin 50 mg/mL Liqd solution    VANOCIN    100 mL    Take 2.5 mLs (125 mg) by mouth 4 times daily    Recurrent Clostridium difficile diarrhea       * vancomycin 125 MG capsule    VANCOCIN    30 capsule    Take 1 capsule (125 mg) by mouth daily    Colitis due to Clostridium difficile       * vancomycin (SUGAR-FREE) 50 mg/mL Liqd solution    VANOCIN    150 mL    Take 2.5 mLs (125 mg) by mouth 4 times daily    Recurrent Clostridium difficile diarrhea       vitamin D2 69468 UNIT capsule    ERGOCALCIFEROL          * Notice:  This list has 4 medication(s) that are the same as other medications prescribed for you. Read the directions carefully, and ask your doctor or other care provider to review them with you.

## 2020-01-23 DIAGNOSIS — R19.7 DIARRHEA, UNSPECIFIED TYPE: Primary | ICD-10-CM

## 2020-01-24 ENCOUNTER — TELEPHONE (OUTPATIENT)
Dept: GASTROENTEROLOGY | Facility: CLINIC | Age: 85
End: 2020-01-24

## 2020-01-24 NOTE — TELEPHONE ENCOUNTER
Per Dr. Cho  This patient, IMT (multiple times) in 2018, reports increased diarrhea.  Could you please have her submit stool samples for testing?  Orders were placed.  She has a health aide and does her labs at [facility].      Orders faxed  Sera Brenner RN  Gastroenterology Care Coordinator  [place], [place]

## 2020-01-31 ENCOUNTER — TRANSCRIBE ORDERS (OUTPATIENT)
Dept: OTHER | Age: 85
End: 2020-01-31

## 2020-01-31 DIAGNOSIS — B37.31 CANDIDIASIS OF VULVA AND VAGINA: Primary | ICD-10-CM

## 2020-01-31 NOTE — TELEPHONE ENCOUNTER
ONCOLOGY INTAKE: Records Information      APPT INFORMATION:  Referring provider:  Margaret Hager  Referring provider s clinic:  [facility]   Reason for visit/diagnosis:  MALCOLM III  Has patient been notified of appointment date and time?: Yes    RECORDS INFORMATION:  Were the records received with the referral (via Rightfax)? Yes, internal referral    Has patient been seen for any external appt for this diagnosis? Yes    If yes, where? [facility]    Has patient had any imaging or procedures outside of [facility] for this condition? Yes      If Yes, where? [facility]     ADDITIONAL INFORMATION:

## 2020-02-03 NOTE — TELEPHONE ENCOUNTER
RECORDS STATUS - ALL OTHER DIAGNOSIS      RECORDS RECEIVED FROM: Epic/CE   DATE RECEIVED: 2/24/2020   NOTES STATUS DETAILS   OFFICE NOTE from referring provider Complete  Margaret Hager   OFFICE NOTE from medical oncologist N/A    DISCHARGE SUMMARY from hospital N/A    DISCHARGE REPORT from the ER     OPERATIVE REPORT N/A    MEDICATION LIST Complete [facility]   CLINICAL TRIAL TREATMENTS TO DATE N/A    LABS     PATHOLOGY REPORTS Requested - Bx slides from [facility] on 2/3/2020 Case: [id number]   [facility] Path   1/30/2020   Labia, left, biopsy - High-grade squamous intraepithelial lesion    ANYTHING RELATED TO DIAGNOSIS Complete Labs last updated on 1/28/2020- [facility]    GENONOMIC TESTING     TYPE:     IMAGING (NEED IMAGES & REPORT)     CT SCANS     MRI     MAMMO     ULTRASOUND     PET       Outside records at [facility]- no need to call pt.

## 2020-02-06 PROCEDURE — 00000346 ZZHCL STATISTIC REVIEW OUTSIDE SLIDES TC 88321: Performed by: OBSTETRICS & GYNECOLOGY

## 2020-02-17 LAB — COPATH REPORT: NORMAL

## 2020-02-24 ENCOUNTER — PRE VISIT (OUTPATIENT)
Dept: ONCOLOGY | Facility: CLINIC | Age: 85
End: 2020-02-24

## 2020-02-24 ENCOUNTER — ONCOLOGY VISIT (OUTPATIENT)
Dept: ONCOLOGY | Facility: CLINIC | Age: 85
End: 2020-02-24
Payer: MEDICARE

## 2020-02-24 VITALS
SYSTOLIC BLOOD PRESSURE: 158 MMHG | OXYGEN SATURATION: 97 % | RESPIRATION RATE: 16 BRPM | WEIGHT: 130.1 LBS | HEART RATE: 96 BPM | DIASTOLIC BLOOD PRESSURE: 85 MMHG | TEMPERATURE: 98.2 F | HEIGHT: 62 IN | BODY MASS INDEX: 23.94 KG/M2

## 2020-02-24 DIAGNOSIS — N90.3 DIFFERENTIATED VULVAR INTRAEPITHELIAL NEOPLASIA (DVIN): Primary | ICD-10-CM

## 2020-02-24 PROCEDURE — 99205 OFFICE O/P NEW HI 60 MIN: CPT | Performed by: OBSTETRICS & GYNECOLOGY

## 2020-02-24 ASSESSMENT — MIFFLIN-ST. JEOR: SCORE: 968.38

## 2020-02-24 ASSESSMENT — PAIN SCALES - GENERAL: PAINLEVEL: NO PAIN (0)

## 2020-02-24 NOTE — LETTER
2020         RE: Kala Olmedo  [address] [facility] [address]        Dear Colleague,    Thank you for referring your patient, Kala Olmedo, to the [facility]. Please see a copy of my visit note below.    Gynecologic Oncology Clinic - New Patient    Referring provider:    Margaret HOUSTON [facility]  [address] [place] [address]    Patient: Kala Olmedo  : 1930    Date of Visit: 2020     Chief Complaint: dVIN, MALCOLM III    History of Present Illness:  Kala Olmedo is a 89 year old post-menopausal female with probable history of cervical dysplasia and remote hysterectomy who presents for newly diagnosed dVIN and MALCOLM III. The dVIN was diagnosed on [facility] overread of local pathology.    She also notes urinary incontinence as well as frequency, which is more bothersome.     She has had vulvar skin changes and ulceration for about a year, although she isn't sure of this date. This causes symptoms she has used steroid cream and barrier ointment. She was seen in January by her provider who due to continued lesions performed vulvar biopsies with findings as above. 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 The vulvar area is still uncomfortable and she has significant urinary frequency which she feels may be related. She continues to use the steroid ointment on an almost daily basis.      Review of Systems:  General: no weight changes, fatigue, fevers, chills  Head/Eyes: no tinnitus, hair loss,visual changes  Ears/Nose/Throat: no stuffiness/congestion, problems with teeth/gum/voice, trouble swallowing  CV: no chest pain, palpitations  Resp: no cough, shortness of breath, difficulty breathing  GI: no nausea, vomiting, constipation, intermittent diarrhea  : positive as per HPI, otherwise negative  MSK: +joint pain/stiffness  Skin:  vulvar skin changes as above  Neuro: no numbness/tingling, memory difficulty, dizziness, seizures  Psych: no depression, anxiety, mood changes  Endocrine: no increased thirst, temperature intolerance, thyroid disease  Heme: not assessed  Allergy/Immune: not assessed    Past Medical History  Past Medical History:   Diagnosis Date     Breast cancer (H) 05/2011    right mastectomy and hormonal therapy     C. difficile diarrhea     Recalcitrant, had fecal transplant     Differentiated vulvar intraepithelial neoplasia (dVIN)      Hypertension     No longer on medication     Lichen sclerosus        Past Surgical History  Past Surgical History:   Procedure Laterality Date     BACK SURGERY      Low back surgery     BREAST SURGERY      right mastectomy     COLONOSCOPY N/A 9/10/2018    Procedure: COMBINED COLONOSCOPY, SINGLE OR MULTIPLE BIOPSY/POLYPECTOMY BY BIOPSY;  Colonoscopy;  Surgeon: Stefano Cho MD;  Location: [facility]     HYSTERECTOMY TOTAL ABDOMINAL  1972    For YUNIER II     JOINT REPLACEMENT, HIP RT/LT Bilateral     left and right hip surgery        OB/Gynecologic History:  G0, infertility; Has 2 adopted children  Last Pap Smear: unknown; she reports a history of hysterectomy in her 30s for abnormal Pap smear.     Social History  Social History     Tobacco Use     Smoking status: Former Smoker     Last attempt to quit: 5/16/2016     Years since quitting: 3.7     Smokeless tobacco: Never Used   Substance Use Topics     Alcohol use: No     Comment: 20 years ago     Drug use: No     Family History  Family History   Problem Relation Age of Onset     Myocardial Infarction Father    No family history of breast, ovary, endometrial, or colon cancer.    Current Outpatient Medications   Medication Sig Dispense Refill     acetaminophen (Q-PAP) 325 MG tablet TK 1 TO 2 TS PO Q 4 H PRN P. USE 1 T FOR PAIN SCALE 1 TO 5 AND USE 2 TS FOR PAIN SCALE 6 TO 10.       ALPRAZolam (XANAX) 0.25 MG tablet TK 1 T PO  TID PRA        "anastrozole (ARIMIDEX) 1 MG tablet TK 1 T PO QD       ascorbic acid (VITAMIN C) 1000 MG TABS Take 1,000 mg by mouth       aspirin EC 81 MG EC tablet TK 1 T PO D       escitalopram (LEXAPRO) 5 MG tablet TK 1 T PO D       ethambutol (MYAMBUTOL) 400 MG tablet        fish oil-omega-3 fatty acids 1000 MG capsule        ipratropium - albuterol 0.5 mg/2.5 mg/3 mL (DUONEB) 0.5-2.5 (3) MG/3ML neb solution NEBULIZE 1 VIAL Q 4 H PRF COPD       levalbuterol (XOPENEX) 1.25 MG/3ML neb solution NEBULIZE 1 VIAL QID FOR COPD       loperamide (IMODIUM) 2 MG capsule TK 1 TO 2 CS PO Q 4 H PRF DIARRHEA       Multiple Vitamin (MULTI-VITAMINS) TABS        mupirocin (BACTROBAN) 2 % ointment AVA TOPICALLY AA BID PRF SUPERFICIAL SKIN INFECTIONS       umeclidinium (INCRUSE ELLIPTA) 62.5 MCG/INH oral inhaler USE 1 PUFF D       vancomycin (VANCOCIN) 125 MG capsule Take 1 capsule (125 mg) by mouth daily 30 capsule 0     vancomycin (VANOCIN) 50 mg/mL LIQD solution Take 2.5 mLs (125 mg) by mouth 4 times daily 100 mL 1     VANCOMYCIN HCL PO Take 125 mg by mouth       vancomycin, SUGAR-FREE, (VANOCIN) 50 mg/mL LIQD solution Take 2.5 mLs (125 mg) by mouth 4 times daily 150 mL 1     vitamin D (ERGOCALCIFEROL) 36043 UNIT capsule           Allergies  Allergies   Allergen Reactions     Alendronic Acid Nausea     Hydrocodone-Acetaminophen Nausea and Vomiting and Other (See Comments)     Lost hearing     Sulfa Drugs Nausea and Vomiting     Tramadol Rash       Physical Exam:   BP (!) 158/85 (BP Location: Left arm)   Pulse 96   Temp 98.2  F (36.8  C) (Oral)   Resp 16   Ht 1.575 m (5' 2\")   Wt 59 kg (130 lb 1.6 oz)   SpO2 97%   BMI 23.80 kg/m     General appearance: Alert and oriented, no acute distress, well groomed  GI: Abdomen soft, non-tender, non-distended. No palpable masses  Skin: no skin changes or lesions aside from vulvar lesions noted below  MSK: Gait is slightly stooped and slow  Psych: alert and oriented, appropriate affect  Lymph: No " palpable lymphadenopathy in the neck, supraclavicular region, groin  Genitourinary:  External: On the external vulva there is slight erythema of much of the skin surrounding the vaginal introitus. The skin is otherwise thin and atrophic. At 5 o'clock there is an area of thickened white epithelium, another area at 6 o'clock. More posteriorly toward the perineum, there is a red, thick, slightly white area just to the [patient's] left of midline. This area is approximately 5x10mm..  Vagina: atrophic mucosa without obvious lesions  Cervix: absent  Bimanual exam: smooth, no palpable lesions or masses    Labs/Pathology:   Patient Name: NENO CORCORAN   MR#: [id number]   Specimen #: [id number]   Collected: 2/6/2020   Received: 2/6/2020   Reported: 2/17/2020 10:24   Ordering Phy(s): DANK ZIMMERMAN   Additional Phy(s): [facility], Department of Pathology     For improved result formatting, select 'View Enhanced Report Format' under    Linked Documents section.     TEST(S):   4 Slides, case # [id number]     FINAL DIAGNOSIS:   CASE FROM [facility], [place], [place] ([id number],   OBTAINED 1/28/20):   A. Vulva, perineum, biopsy:   - Differentiated vulvar intraepithelial neoplasia   - Lichen sclerosis   - See comment     B. 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 We agree with the original interpreting pathologist's finding of   lichenoid keratosis (lichen sclerosis).   The biopsy also shows significant atypia in the basal layer, sufficient   for a diagnosis of differentiated MALCOLM.   This is not an HPV related lesion.     B. We agree with the original interpreting pathologist. A p16 immunostain   (performed by the referring   institution and provided for our review with appropriate positive control)    is strongly positive, supporting   the diagnosis of MALCOLM 3 (an HPV related lesion).     I have  personally reviewed all specimens and/or slides, including the   listed special stains, and used them   with my medical judgement to determine or confirm the final diagnosis.     Electronically signed out by:     Karla Mcmanus M.D., [facility]     Imaging:   n/a    Assessment:  Kala Olmedo is a 89 year old here as a new patient with lichen sclerosus, history of hysterectomy for probable YUNIER who presents with newly diagnosed MALCOLM III and dVIN.    Plan:   1. Vulvar dysplasia: We reviewed the findings of two different kinds of vulvar dysplasia. We discussed that MALCOLM III is generally HPV related, and it may be related to the fact that she had what sounds like cervical dysplasia in the past leading to her hysterectomy. This is the more common type and is generally considered to have less risk of malignancy; however, the risk of underlying malignancy is still around 10-20%.   2. In contrast, differentiated MALCOLM, or dVIN, is more rare and associated with vulvar dermatoses, such as lichen sclerosus. This carries a much higher risk of underlying malignancy, as high as 30%. For this reason, excision is the recommended treatment for dVIN to rule out underlying malignancy. I would also recommend excision for her other lesions. We discussed alternatives which would include LASER or ablative therapy or topical treatments such as Aldara. Again, I strongly recommend resection, particularly of the dVIN lesion to rule out malignancy. We discussed that if malignancy is found, we may recommend additional surgery or treatment, which would be discussed in more detail with her particularly regarding the risks/benefits at her age.   3. Consent was reviewed and signed for Wide local excision of the vulva. We discussed high risks of wound breakdown which is as high as 50%, possibly even higher with vulvar dermatoses. Risk of underlying malignancy and need for further treatment or procedures, risk of infection, bleeding, and recurrence of  disease. There are also risks with anesthesia, which will be reviewed in more detail with her by CRNA day of surgery. I would like her to see her PCP for pre-operative clearance and to ensure no further testing is necessary prior to anesthesia. We will ask for clearance to perform the procedure in the [facility] otherwise it will be done at the [facility] or [facility].    I spent a total of 60 minutes with Kala Olmedo during today's office visit. Over 50% of this time was spent in face to face counseling regarding her diagnosis of dVIN and MALCOLM III and recommended treatment including risks/benefits and importance of compliance as detailed in the plan above.     Karlos Fall MD    Again, thank you for allowing me to participate in the care of your patient.        Sincerely,        Karlos Fall MD

## 2020-02-24 NOTE — PROGRESS NOTES
Gynecologic Oncology Clinic - New Patient    Referring provider:    Margaret HOUSTON [facility]  [address] [place] [address]    Patient: Kala Olmedo  : 1930    Date of Visit: 2020     Chief Complaint: dVIN, MALCOLM III    History of Present Illness:  Kala Olmedo is a 89 year old post-menopausal female with probable history of cervical dysplasia and remote hysterectomy who presents for newly diagnosed dVIN and MALCOLM III. The dVIN was diagnosed on [facility] overread of local pathology.    She also notes urinary incontinence as well as frequency, which is more bothersome.     She has had vulvar skin changes and ulceration for about a year, although she isn't sure of this date. This causes symptoms she has used steroid cream and barrier ointment. She was seen in January by her provider who due to continued lesions performed vulvar biopsies with findings as above. 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 The vulvar area is still uncomfortable and she has significant urinary frequency which she feels may be related. She continues to use the steroid ointment on an almost daily basis.      Review of Systems:  General: no weight changes, fatigue, fevers, chills  Head/Eyes: no tinnitus, hair loss,visual changes  Ears/Nose/Throat: no stuffiness/congestion, problems with teeth/gum/voice, trouble swallowing  CV: no chest pain, palpitations  Resp: no cough, shortness of breath, difficulty breathing  GI: no nausea, vomiting, constipation, intermittent diarrhea  : positive as per HPI, otherwise negative  MSK: +joint pain/stiffness  Skin: vulvar skin changes as above  Neuro: no numbness/tingling, memory difficulty, dizziness, seizures  Psych: no depression, anxiety, mood changes  Endocrine: no increased thirst, temperature intolerance, thyroid disease  Heme: not assessed  Allergy/Immune:  not assessed    Past Medical History  Past Medical History:   Diagnosis Date     Breast cancer (H) 05/2011    right mastectomy and hormonal therapy     C. difficile diarrhea     Recalcitrant, had fecal transplant     Differentiated vulvar intraepithelial neoplasia (dVIN)      Hypertension     No longer on medication     Lichen sclerosus        Past Surgical History  Past Surgical History:   Procedure Laterality Date     BACK SURGERY      Low back surgery     BREAST SURGERY      right mastectomy     COLONOSCOPY N/A 9/10/2018    Procedure: COMBINED COLONOSCOPY, SINGLE OR MULTIPLE BIOPSY/POLYPECTOMY BY BIOPSY;  Colonoscopy;  Surgeon: Stefano Cho MD;  Location: [facility]     HYSTERECTOMY TOTAL ABDOMINAL  1972    For YUNIER II     JOINT REPLACEMENT, HIP RT/LT Bilateral     left and right hip surgery        OB/Gynecologic History:  G0, infertility; Has 2 adopted children  Last Pap Smear: unknown; she reports a history of hysterectomy in her 30s for abnormal Pap smear.     Social History  Social History     Tobacco Use     Smoking status: Former Smoker     Last attempt to quit: 5/16/2016     Years since quitting: 3.7     Smokeless tobacco: Never Used   Substance Use Topics     Alcohol use: No     Comment: 20 years ago     Drug use: No     Family History  Family History   Problem Relation Age of Onset     Myocardial Infarction Father    No family history of breast, ovary, endometrial, or colon cancer.    Current Outpatient Medications   Medication Sig Dispense Refill     acetaminophen (Q-PAP) 325 MG tablet TK 1 TO 2 TS PO Q 4 H PRN P. USE 1 T FOR PAIN SCALE 1 TO 5 AND USE 2 TS FOR PAIN SCALE 6 TO 10.       ALPRAZolam (XANAX) 0.25 MG tablet TK 1 T PO  TID PRA       anastrozole (ARIMIDEX) 1 MG tablet TK 1 T PO QD       ascorbic acid (VITAMIN C) 1000 MG TABS Take 1,000 mg by mouth       aspirin EC 81 MG EC tablet TK 1 T PO D       escitalopram (LEXAPRO) 5 MG tablet TK 1 T PO D       ethambutol (MYAMBUTOL) 400 MG tablet         "fish oil-omega-3 fatty acids 1000 MG capsule        ipratropium - albuterol 0.5 mg/2.5 mg/3 mL (DUONEB) 0.5-2.5 (3) MG/3ML neb solution NEBULIZE 1 VIAL Q 4 H PRF COPD       levalbuterol (XOPENEX) 1.25 MG/3ML neb solution NEBULIZE 1 VIAL QID FOR COPD       loperamide (IMODIUM) 2 MG capsule TK 1 TO 2 CS PO Q 4 H PRF DIARRHEA       Multiple Vitamin (MULTI-VITAMINS) TABS        mupirocin (BACTROBAN) 2 % ointment AVA TOPICALLY AA BID PRF SUPERFICIAL SKIN INFECTIONS       umeclidinium (INCRUSE ELLIPTA) 62.5 MCG/INH oral inhaler USE 1 PUFF D       vancomycin (VANCOCIN) 125 MG capsule Take 1 capsule (125 mg) by mouth daily 30 capsule 0     vancomycin (VANOCIN) 50 mg/mL LIQD solution Take 2.5 mLs (125 mg) by mouth 4 times daily 100 mL 1     VANCOMYCIN HCL PO Take 125 mg by mouth       vancomycin, SUGAR-FREE, (VANOCIN) 50 mg/mL LIQD solution Take 2.5 mLs (125 mg) by mouth 4 times daily 150 mL 1     vitamin D (ERGOCALCIFEROL) 34561 UNIT capsule           Allergies  Allergies   Allergen Reactions     Alendronic Acid Nausea     Hydrocodone-Acetaminophen Nausea and Vomiting and Other (See Comments)     Lost hearing     Sulfa Drugs Nausea and Vomiting     Tramadol Rash       Physical Exam:   BP (!) 158/85 (BP Location: Left arm)   Pulse 96   Temp 98.2  F (36.8  C) (Oral)   Resp 16   Ht 1.575 m (5' 2\")   Wt 59 kg (130 lb 1.6 oz)   SpO2 97%   BMI 23.80 kg/m    General appearance: Alert and oriented, no acute distress, well groomed  GI: Abdomen soft, non-tender, non-distended. No palpable masses  Skin: no skin changes or lesions aside from vulvar lesions noted below  MSK: Gait is slightly stooped and slow  Psych: alert and oriented, appropriate affect  Lymph: No palpable lymphadenopathy in the neck, supraclavicular region, groin  Genitourinary:  External: On the external vulva there is slight erythema of much of the skin surrounding the vaginal introitus. The skin is otherwise thin and atrophic. At 5 o'clock there is an area " of thickened white epithelium, another area at 6 o'clock. More posteriorly toward the perineum, there is a red, thick, slightly white area just to the [patient's] left of midline. This area is approximately 5x10mm..  Vagina: atrophic mucosa without obvious lesions  Cervix: absent  Bimanual exam: smooth, no palpable lesions or masses    Labs/Pathology:   Patient Name: NENO CORCORAN   MR#: [id number]   Specimen #: [id number]   Collected: 2/6/2020   Received: 2/6/2020   Reported: 2/17/2020 10:24   Ordering Phy(s): DANK ZIMMERMAN   Additional Phy(s): [facility], Department of Pathology     For improved result formatting, select 'View Enhanced Report Format' under    Linked Documents section.     TEST(S):   4 Slides, case # [id number]     FINAL DIAGNOSIS:   CASE FROM [facility], [place], [place] ([id number],   OBTAINED 1/28/20):   A. Vulva, perineum, biopsy:   - Differentiated vulvar intraepithelial neoplasia   - Lichen sclerosis   - See comment     B. 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 We agree with the original interpreting pathologist's finding of   lichenoid keratosis (lichen sclerosis).   The biopsy also shows significant atypia in the basal layer, sufficient   for a diagnosis of differentiated MALCOLM.   This is not an HPV related lesion.     B. We agree with the original interpreting pathologist. A p16 immunostain   (performed by the referring   institution and provided for our review with appropriate positive control)    is strongly positive, supporting   the diagnosis of MALCOLM 3 (an HPV related lesion).     I have personally reviewed all specimens and/or slides, including the   listed special stains, and used them   with my medical judgement to determine or confirm the final diagnosis.     Electronically signed out by:     Karla Mcmanus M.D., [facility]     Imaging:    n/a    Assessment:  Kala Olmedo is a 89 year old here as a new patient with lichen sclerosus, history of hysterectomy for probable YUNIER who presents with newly diagnosed MALCOLM III and dVIN.    Plan:   1. Vulvar dysplasia: We reviewed the findings of two different kinds of vulvar dysplasia. We discussed that MALCOLM III is generally HPV related, and it may be related to the fact that she had what sounds like cervical dysplasia in the past leading to her hysterectomy. This is the more common type and is generally considered to have less risk of malignancy; however, the risk of underlying malignancy is still around 10-20%.   2. In contrast, differentiated MALCOLM, or dVIN, is more rare and associated with vulvar dermatoses, such as lichen sclerosus. This carries a much higher risk of underlying malignancy, as high as 30%. For this reason, excision is the recommended treatment for dVIN to rule out underlying malignancy. I would also recommend excision for her other lesions. We discussed alternatives which would include LASER or ablative therapy or topical treatments such as Aldara. Again, I strongly recommend resection, particularly of the dVIN lesion to rule out malignancy. We discussed that if malignancy is found, we may recommend additional surgery or treatment, which would be discussed in more detail with her particularly regarding the risks/benefits at her age.   3. Consent was reviewed and signed for Wide local excision of the vulva. We discussed high risks of wound breakdown which is as high as 50%, possibly even higher with vulvar dermatoses. Risk of underlying malignancy and need for further treatment or procedures, risk of infection, bleeding, and recurrence of disease. There are also risks with anesthesia, which will be reviewed in more detail with her by CRNA day of surgery. I would like her to see her PCP for pre-operative clearance and to ensure no further testing is necessary prior to anesthesia. We will ask for  clearance to perform the procedure in the  ASC otherwise it will be done at the [facility] or [facility].    I spent a total of 60 minutes with Kala Honorio during today's office visit. Over 50% of this time was spent in face to face counseling regarding her diagnosis of dVIN and MALCOLM III and recommended treatment including risks/benefits and importance of compliance as detailed in the plan above.     Karlos Fall MD

## 2020-02-24 NOTE — NURSING NOTE
"Oncology Rooming Note    February 24, 2020 12:38 PM   Kala Olmedo is a 89 year old female who presents for:    Chief Complaint   Patient presents with     Oncology Clinic Visit     New patient     Initial Vitals: BP (!) 158/85 (BP Location: Left arm)   Pulse 96   Temp 98.2  F (36.8  C) (Oral)   Resp 16   Ht 1.575 m (5' 2\")   Wt 59 kg (130 lb 1.6 oz)   SpO2 97%   BMI 23.80 kg/m   Estimated body mass index is 23.8 kg/m  as calculated from the following:    Height as of this encounter: 1.575 m (5' 2\").    Weight as of this encounter: 59 kg (130 lb 1.6 oz). Body surface area is 1.61 meters squared.  No Pain (0) Comment: Data Unavailable   No LMP recorded. Patient has had a hysterectomy.  Allergies reviewed: Yes  Medications reviewed: Yes    Medications: Medication refills not needed today.  Pharmacy name entered into [facility]:    [id number] - [place], [place] - [facility] [facility] [address] AT [facility] - [place], [place] - [facility] SANTANA Ivy LPN              "

## 2020-02-24 NOTE — NURSING NOTE
Patient Education Note - [facility]    Relevant Diagnosis:  Vulvar dysplasia    Teaching Topic:  Wide local excision of the vulva    Person(s) involved in teaching:  Patient and friend    Motivation Level:    Asks Questions:   Yes  Eager to Learn:  Yes  Cooperative:  Yes  Receptive (willing/able to accept information):  Yes  Comments:  None    Patient demonstrates understanding of the following:  Reason for the appointment, diagnosis and treatment plan:  Yes  Knowledge of proper use of medications and conditions for which they are ordered (with special attention to potential side effects or drug interactions):  Yes  Which situations necessitate calling provider and whom to contact:  Yes    Teaching Concerns:  No    Education/Instructional Materials Used/Given:     Before Your Surgery Booklet ([place])  Showering Before Surgery, CHG prep provided  Adult Pain Assessment Tool  Home Care after Gynecologic/Vulvar Surgery  [facility] ([place])  Phone numbers for [facility] and [address] Given to Patient    Tests ordered today: None    Post-op exam: 1-2 weeks after surgery, date is yet to be determined.    Pre-op exam: Patient will schedule this with her PCP within 30 days prior to surgery.    Time spent teaching with patient:  20 minutes    Surgical consent forms faxed to [facility] at fax number [phone].  Surgery scheduling team at [facility] also notified.    Tyson Palomo, RN, BSN, OCN  Oncology Care Coordinator  [facility]

## 2020-02-26 ENCOUNTER — TELEPHONE (OUTPATIENT)
Dept: ONCOLOGY | Facility: CLINIC | Age: 85
End: 2020-02-26

## 2020-02-26 ENCOUNTER — HOSPITAL ENCOUNTER (OUTPATIENT)
Facility: AMBULATORY SURGERY CENTER | Age: 85
End: 2020-02-26
Attending: OBSTETRICS & GYNECOLOGY
Payer: COMMERCIAL

## 2020-02-26 DIAGNOSIS — N90.3 DIFFERENTIATED VULVAR INTRAEPITHELIAL NEOPLASIA (DVIN): ICD-10-CM

## 2020-02-26 NOTE — TELEPHONE ENCOUNTER
I scheduled surgery for this patient with Dr. Fall on 3/23 at [facility]. Scheduled per opening.    I called the patient and was able to confirm the scheduled dates.  anesthesia confirmed this.    The RN has completed the education regarding the surgery, and they were provided a surgery packet with instructions and a map.     They are aware that they will receive a call  ~2 days prior to the scheduled procedure and will be given an exact arrival/start time.    PAC/H&P: will be on 3/6 with her PCP. Juan Carlos Parada    Post-Op: will be scheduled at .

## 2020-02-26 NOTE — TELEPHONE ENCOUNTER
Spoke with Kala about what might happen if she elected not to proceed with surgery per her request. 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 We reviewed risk of underlying cancer and that if there is an as of yet undiagnosed cancer, that would continue to grow and potentially spread. We discussed option of treating symptoms rather than proceeding with surgery. She has concerns regarding wound healing given her significant urinary frequency/incontinence. We discussed management strategies; however, this does seem like it signifcantly impacts her QOL as she had to take two bathroom breaks during our visit on Monday. I encouraged her to speak with her PCP about this to discuss possible management options, as it could be helpful to have this better controlled from a post-op standpoint just given it will be harder for her to get around and she will have to do alex-care after each void.     She would like to proceed with surgery as scheduled.    Karlos Fall MD    Gynecologic Oncology

## 2020-03-10 ENCOUNTER — DOCUMENTATION ONLY (OUTPATIENT)
Dept: ONCOLOGY | Facility: CLINIC | Age: 85
End: 2020-03-10

## 2020-03-10 ENCOUNTER — PATIENT OUTREACH (OUTPATIENT)
Dept: ONCOLOGY | Facility: CLINIC | Age: 85
End: 2020-03-10

## 2020-03-10 NOTE — PROGRESS NOTES
[facility]:  Care Coordination Note    Received notification that patient had called into clinic last week, requesting to reschedule her surgery with Dr. Fall to August this year sometime.  Patient's daughter (who lives in [place]) is planning to come to [place] to stay with her, to help take care of her after the procedure - this will happen sometime in August, per patient likely toward the end of August.      Telephone call was placed to patient this morning to discuss further.  Patient verifies that she would like to cancel her procedure for now, and states she will call back to reschedule once she knows for sure a time frame of when her daughter will be coming to stay with her.  Dr. Fall was notified.  Post-op appointment for 4/6/20 cancelled.  Message was sent to surgery scheduling team, with request to cancel patient's procedure that is currently scheduled for March.  Provided patient with direct phone number for this RN to call once she knows when she would like to get rescheduled.  Will await callback from patient.     Tyson Palomo, RN, BSN, OCN  Oncology Care Coordinator  [facility]

## 2020-11-10 ENCOUNTER — PATIENT OUTREACH (OUTPATIENT)
Dept: ONCOLOGY | Facility: CLINIC | Age: 85
End: 2020-11-10

## 2020-11-10 DIAGNOSIS — D07.1 VIN III (VULVAR INTRAEPITHELIAL NEOPLASIA III): Primary | ICD-10-CM

## 2020-11-10 RX ORDER — CEFAZOLIN SODIUM 2 G/50ML
2 SOLUTION INTRAVENOUS
Status: CANCELLED | OUTPATIENT
Start: 2020-11-10

## 2020-11-10 RX ORDER — CEFAZOLIN SODIUM 1 G/50ML
1 INJECTION, SOLUTION INTRAVENOUS SEE ADMIN INSTRUCTIONS
Status: CANCELLED | OUTPATIENT
Start: 2020-11-10

## 2020-11-10 RX ORDER — HEPARIN SODIUM 5000 [USP'U]/.5ML
5000 INJECTION, SOLUTION INTRAVENOUS; SUBCUTANEOUS
Status: CANCELLED | OUTPATIENT
Start: 2020-11-10

## 2020-11-10 NOTE — PROGRESS NOTES
[facility]:  Care Coordination Note    Per previous chart documentation, patient was supposed to call us toward August/September of this year to let us know when she would like to reschedule her surgery with Dr. Fall (which was originally scheduled in March of 2020).  Noted patient has not yet called back, and surgery was never rescheduled.  Reviewed with Dr. Fall, who advises that the patient should have a follow-up visit with exam in clinic since it has been >6 months since she has been seen, and also we should work on rescheduling her surgery soon since patient's condition has a high risk of progressing to cancer.      Writer placed telephone call to patient this morning to discuss recommendations from Dr. Glasgow as above, patient did not answer telephone call at this time, detailed voicemail message was left on patient's home phone.  Asked patient to please return call at her earliest convenience to discuss appointment and surgery scheduling.  Direct phone number for this RN was provided in message.     Tyson Palomo, RN, BSN, OCN  Oncology Care Coordinator  [facility] [place] [facility]

## 2020-11-13 ENCOUNTER — HOSPITAL ENCOUNTER (OUTPATIENT)
Facility: CLINIC | Age: 85
End: 2020-11-13
Attending: OBSTETRICS & GYNECOLOGY | Admitting: OBSTETRICS & GYNECOLOGY
Payer: COMMERCIAL

## 2020-11-13 ENCOUNTER — DOCUMENTATION ONLY (OUTPATIENT)
Dept: ONCOLOGY | Facility: CLINIC | Age: 85
End: 2020-11-13

## 2020-11-13 DIAGNOSIS — D07.1 VIN III (VULVAR INTRAEPITHELIAL NEOPLASIA III): ICD-10-CM

## 2020-11-13 NOTE — PROGRESS NOTES
Surgery is scheduled with Dr. Glasgow on 12/17 at [facility].  Scheduled per request of surgeon.    H&P: to be completed by Dr. Glasgow.  COVID-19 test: 12/14 at [facility] at 10 AM   Post-op: will be scheduled by the clinic.     The RN completed the education regarding the surgery.     Patient will receive a phone call from pre-admission nurses 1-2 days prior to surgery with arrival and start time.      Patient will complete COVID-19 test that was scheduled by surgical coordinator 2-4 days prior to surgery.     Per communication - I did not need to reach out to the patient regarding the above information. If this changes, I will reach out.

## 2020-11-16 NOTE — PROGRESS NOTES
[facility]:  Care Coordination Note    Writer was able to connect with patient via telephone this morning, to discuss the possibility of rescheduling her surgery in the near future (wide local excision of the vulva, due to MALCOLM III and dVIN), which was postponed from March of this year due to COVID-19 restrictions at that time.    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  Patient states she does not feel her body could handle surgery with how her health has been.  Patient understands that her condition is pre-cancer and has the potential to progress to cancer in the future, however states that she is not interested (and does not feel capable) of being seen in clinic or scheduling surgery now.  She states she will call back if she changes her mind.    Note routed to Dr. Fall, Dr. Glasgow, and surgery scheduling team with this update.     Tyson Palomo, RN, BSN, OCN  Oncology Care Coordinator  [facility]

## 2020-11-20 DIAGNOSIS — Z11.59 ENCOUNTER FOR SCREENING FOR OTHER VIRAL DISEASES: Primary | ICD-10-CM
